# Patient Record
Sex: FEMALE | Race: WHITE | Employment: UNEMPLOYED | URBAN - METROPOLITAN AREA
[De-identification: names, ages, dates, MRNs, and addresses within clinical notes are randomized per-mention and may not be internally consistent; named-entity substitution may affect disease eponyms.]

---

## 2020-07-06 ENCOUNTER — VIRTUAL VISIT (OUTPATIENT)
Dept: FAMILY MEDICINE CLINIC | Age: 35
End: 2020-07-06

## 2020-07-06 DIAGNOSIS — M79.601 RIGHT ARM PAIN: ICD-10-CM

## 2020-07-06 DIAGNOSIS — M62.89 PELVIC FLOOR DYSFUNCTION: Primary | ICD-10-CM

## 2020-07-06 DIAGNOSIS — R00.2 HEART PALPITATIONS: ICD-10-CM

## 2020-07-06 DIAGNOSIS — M25.531 RIGHT WRIST PAIN: ICD-10-CM

## 2020-07-06 NOTE — PROGRESS NOTES
Natalee Raymond is a 28 y.o. female who was seen by synchronous (real-time) audio-video technology on 7/6/2020 for Wrist Pain (right)    Assessment & Plan:   Diagnoses and all orders for this visit:    1. Pelvic floor dysfunction  -     REFERRAL TO PHYSICAL THERAPY    2. Right arm pain  -     REFERRAL TO PHYSICAL THERAPY    3. Heart palpitations    4. Right wrist pain  -     REFERRAL TO PHYSICAL THERAPY      I spent at least 20 minutes on this visit with this new patient. 712  Subjective:     Patient states she recently moved from [place] [address]. Reports she was in pelvic floor therapy for pelvic floor dysfunction. States she has a lot pelvic pain. Would like to continue PT. Wrist: right wrist pain. Comments she has increased pain to posterior wrist radiating up to thumb and also radiates up her arm. Comments she also has right upper arm pain. States when she was playing softball in high school she felt something rip which she never had this evaluated. States if she uses arm too much she has increased pain. States after she had her last child in 2018 5 days after giving birth she coded and dx with magnesium toxicity. Comments at times she feels heart palpitations intermittently. Denies having further evaluation of heart palpitations. Prior to Admission medications    Not on File     There is no problem list on file for this patient. There are no active problems to display for this patient. Not on File  History reviewed. No pertinent past medical history. No past surgical history on file. No family history on file. Social History     Tobacco Use    Smoking status: Not on file   Substance Use Topics    Alcohol use: Not on file       Review of Systems   Constitutional: Negative for chills and fever. Respiratory: Negative for shortness of breath. Cardiovascular: Negative for chest pain and palpitations. Neurological: Negative for dizziness and headaches. Objective:   No flowsheet data found. General: alert, cooperative, no distress   Mental  status: normal mood, behavior, speech, dress, motor activity, and thought processes, able to follow commands   HENT: NCAT   Neck: no visualized mass   Resp: no respiratory distress   Neuro: no gross deficits   Skin: no discoloration or lesions of concern on visible areas   Psychiatric: normal affect, consistent with stated mood, no evidence of hallucinations     Additional exam findings: We discussed the expected course, resolution and complications of the diagnosis(es) in detail. Medication risks, benefits, costs, interactions, and alternatives were discussed as indicated. I advised her to contact the office if her condition worsens, changes or fails to improve as anticipated. She expressed understanding with the diagnosis(es) and plan. Candy Jones, who was evaluated through a patient-initiated, synchronous (real-time) audio-video encounter, and/or her healthcare decision maker, is aware that it is a billable service, with coverage as determined by her insurance carrier. She provided verbal consent to proceed: Yes, and patient identification was verified. It was conducted pursuant to the emergency declaration under the [address] [place] [address] authority and the [employer] General Act. A caregiver was present when appropriate. Ability to conduct physical exam was limited. I was in the office. The patient was at home.       Anuj Teague NP

## 2020-07-27 ENCOUNTER — HOSPITAL ENCOUNTER (OUTPATIENT)
Dept: PHYSICAL THERAPY | Age: 35
Discharge: HOME | End: 2020-07-27
Payer: COMMERCIAL

## 2020-07-27 PROCEDURE — 97165 OT EVAL LOW COMPLEX 30 MIN: CPT

## 2020-07-27 NOTE — PROGRESS NOTES
[facility]  [address] [place] [address]  [phone] fax    Plan of Care/Statement of Necessity for Occupational Therapy Services    Patient name: Candy Jones Start of Care: 2020   Referral source: Gregorio Ny NP : 1985    Medical Diagnosis: Pain in right arm [M79.601]  Pain in right wrist [M25.531]  Payor: [address] [place] [address] Road / Plan: 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 Assessment/ key information: *pt is a right hand dominant, 35 y.o.y/o, female who has had history of right shoulder and upper arm pain for over 10 years with on and off pattern when throwing etc.  She reports recent onset of wrist and thumb pain. She has tried a neoprene thumb brace at night, which relieved wrist pain but made thumb hurt worse when removed. Notable changes in activity which may have caused onset on recent move from [facility] [place] with increased lifting, carrying etc.  She has normal AROM in wrist with exception of radial deviaiton which is painful and reduced to 12 vs 20 for left. She is tender to palpation at radial wrist and one inch proximal.  She has (+) finklestein's test.  Wrist strength is reduced to 3+/5 for all except ulnar deviation.  is reduced at 32#, with pinches of 7-9#. 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   Her FOTO is 46/100 reflecting severe impairment in function. She will benefit from skilled occupational therapy to address right wrist and hand. pain. Focus of occupational therapy will be on more recent wrist and hand pain. Evaluation Complexity: History LOW Complexity : Brief history review  Examination LOW Complexity : 1-3 performance deficits relating to physical, cognitive , or psychosocial skils that result in activity limitations and / or participation restrictions  Clinical Decision Making LOW Complexity : No comorbidities that affect functional and no verbal or physical assistance needed to complete eval tasks   Overall Complexity Rating: LOW     Problem List: Pain effecting function, Decreased strength, Decreased ADL/functional abilities  and Decreased activity tolerance     Treatment Plan may include any combination of the following: Therapeutic exercise, Therapeutic activities, Physical agent/modality, Patient education and ADLs/IADLs    Patient / Family readiness to learn indicated by: asking questions, trying to perform skills and interest    Persons(s) to be included in education:   patient (P)    Barriers to Learning/Limitations: None    Patient Goal (s): less pain    Patient Self Reported Health Status: excellent    Rehabilitation Potential: good    Short Term Goals: To be accomplished in 2 weeks:  1.  patient will be familiar with positions and activities  to avoid to reduce symptoms of wrist and thumb pain. 2.  Patient will report pain in right wrist and thumb reduced to at least 4/10 with use of modaliteis in clinic. 3.  Patient will be able to move right wrist through all planes without pain. Long Term Goals: To be accomplished in 4 weeks:   1. Patient will increase right wrist strength to at least 4=/5 for lifting and carrying activities. 2.  Patient will report pain in right wrist decreased to 2/10 with routine childcare and home care tasks.   3.  Patient will increase right   by at least 20# for return to dominance for opening jars and cutting foods. 4.  Patient will achieve at least 10# pinch in all prehension patterns for fastening carseat, clothing etc.    5.  Patient will improve use of right hand for carrying and lifting as shown by increase in FOTO of at least 15 points. Frequency / Duration: Patient to be seen 2 times per week for 4 weeks:    Patient/ Caregiver education and instruction: Diagnosis, prognosis, self care, activity modification, brace/ splint application and exercises   [x]  Plan of care has been reviewed with EKVIN Queen 7/27/2020 11:01 AM    _____________________________________________________________________    I certify that the above Therapy Services are being furnished while the patient is under my care. I agree with the treatment plan and certify that this therapy is necessary.     Physician's Signature:____________Date:_________TIME:________    ** Signature, Date and Time must be completed for valid certification **    Please sign and return to [facility]   [address] [place] [address]  [phone] fax

## 2020-07-27 NOTE — PROGRESS NOTES
OT DAILY TREATMENT NOTE 10-18    Patient Name: Leonela Teresa  Date:2020  : 1985  [x]  Patient  Verified  Payor: [facility] [address] / Plan: [address] / Product Type: Managed Care Medicaid /    In time:1015  Out time:1045  Total Treatment Time (min): 30  Visit #: 1 of 8    Treatment Area: Pain in right arm [M79.601]  Pain in right wrist [M25.531]    SUBJECTIVE  Pain Level (0-10 scale): 9/10  Any medication changes, allergies to medications, adverse drug reactions, diagnosis change, or new procedure performed?: [x] No    [] Yes (see summary sheet for update)  Subjective functional status/changes:   [] No changes reported  Wrist really hurts    OBJECTIVE      30 min [x]Eval                  []Re-Eval           With   [] TE   [] TA   [] neuro   [] other: Patient Education: [x] Review HEP    [] Progressed/Changed HEP based on:  OT POC, [place]  [] positioning   [] body mechanics   [] transfers   [] heat/ice application   [] Splint wear/care   [] Sensory re-education   [] scar management      [] other:            Other Objective/Functional Measures:   Subjective: pt is a right hand dominant, 35 y.o.y/o, female who has had history of right shoulder and upper arm pain for over 10 years with on and off pattern when throwing etc.  Recent onset of wrist and thumb pain. 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        Objective:  Sensation:Reports occasional tingling  michi of Motion:Proximal AROm WFL     Active      Norms Right Left                                                                                      Wrist Flex 0-80 65 65      Ext 0-70 70 70      Ulnar Dev 0-30 28 28      Radial Dev 0-20 12p! 20       (+) finklestein's test    Strength:    Right Left                                                         Wrist Flex 3+ 5    Ext 3+ 5    Ulnar Dev 5 5    Radial Dev 3+ 5     Hand ROM R/L       Thumb    MP      CMC   PIP     80/85 MP   DIP     95/105 IP        45/50 Francis Abd        42/42 Radial Abd     Hand Strength:   Gross Grasp 3pt Pinch Lateral Pinch Tip Pinch   Right  32 7 8 9   Left 52 10 16 10     Nine-Hole Peg Test:  Left= _NT____seconds  Right=_15____seconds  Finger Opposition:to all tips and base    Palpation: Tender to 1 inch proximal to radial styloid    ADLs  Feeding:        []MaxA   []ModA   [x]Zehra   [] CGA   []SBA   []Lanie   []Independent  UE Dressing:       []MaxA   []ModA   [x]Zehra   [] CGA   []SBA   []Lanie   []Independent  LE Dressing:       []MaxA   []ModA   [x]Zehra   [] CGA   []SBA   []Lanie   []Independent  Grooming:       []MaxA   []ModA   [x]Zehra   [] CGA   []SBA   []Lanie   []Independent  Toileting:       []MaxA   []ModA   []Zehra   [] CGA   []SBA   []Lanie   [x]Independent  Bathing:       []MaxA   []ModA   [x]Zehra   [] CGA   []SBA   []Lanie   []Independent  Light Meal Prep:    []MaxA   [x]ModA   []Zehra   [] CGA   []SBA   []Lanie   []Independent  Household/Other: []MaxA   []ModA   [x]Zehra   [] CGA   []SBA   []Lanie   []Independent  Adaptive Equip:     []MaxA   []ModA   []Zehra   [] CGA   []SBA   []Lanie   []Independent  Driving:       []MaxA   []ModA   [x]Zehra   [] CGA   []SBA   []Lanie   []Independent  Money Mgmt:        []MaxA   []ModA   [x]Zehra   [] CGA   []SBA   []Lanie []Independent       Pain Level (0-10 scale) post treatment: 9/10    ASSESSMENT/Changes in Function: [x]  See Plan of Care  []  See progress note/recertification  []  See Discharge Summary             PLAN  []  Upgrade activities as tolerated     []  Continue plan of care  []  Update interventions per flow sheet       []  Discharge due to:_  []  Other:_      KORIN Whatley/L 7/27/2020  9:52 AM    Future Appointments   Date Time Provider Naren Khan   7/27/2020 10:15 AM Milla Worley OTR/[facility]   8/4/2020  2:20 PM Adelia Skiff, NP [facility]

## 2020-08-03 ENCOUNTER — HOSPITAL ENCOUNTER (OUTPATIENT)
Dept: PHYSICAL THERAPY | Age: 35
Discharge: HOME | End: 2020-08-03
Payer: COMMERCIAL

## 2020-08-03 PROCEDURE — 97161 PT EVAL LOW COMPLEX 20 MIN: CPT

## 2020-08-03 NOTE — PROGRESS NOTES
[facility]  [facility]  [address] [place] [address]  [phone] fax    Plan of Care/ Statement of Necessity for Physical Therapy Services    Patient name: Sukhwinder Cochran Start of Care: 8/3/2020   Referral source: Benton Boxer, NP : 1985    Medical Diagnosis: Pelvic floor dysfunction [M62.89]  Payor: [address] [place] [address] Road / Plan: [address] / Product Type: Managed Care Medicaid /  Onset Date: aggravated about 6 months    Treatment Diagnosis: PFD, abdomen and hip pain   Prior Hospitalization: see medical history Provider#: [id number]   Medications: Verified on Patient summary List    Comorbidities: n/a   Prior Level of Function: less pain, mod ind with ADLs/house chores and job duties           The Plan of Care and following information is based on the information from the initial evaluation. Assessment/ key information: Ms. Sukhwinder Cochran is a 29 y/o, F who present with c/o PFD, abdominal and hip pain. Pain aggravated with lifting, carrying her children, prolonged activities. Problem started since her last pregnancy and childbirth around . Pt recalls multiple complications during the pregnancy. Pt also went through a stressful situations during the last 1 year. Pt had 5 natural childbiths 2005, , 2012, 2016, and 2018. PT has been very helpful with her problem but she had gap due to personal and family problem. Pt reports no significant problem with bladder or bowel; only min urinary leakage. Pt reports pain which occurs with internal assessment and deep penetration during sexual relations. Pain is rated 1/3 on Marinoff scale. Pt also notices shooting pain along ant thighs. Evaluation reveals patient with musculoskeletal screen Left upslip, TTP along B iliopsoas and fair core strength. Pt demonstrated min increased tone of PF muscles but significant hypersensitivity.  She also demonstrates poor strength and endurance, 2/5 with significant overflow of TA and Valsalva Maneuver. Patient may benefit from physical therapy to address these limitations to improve her QOL. Evaluation Complexity History LOW Complexity : Zero comorbidities / personal factors that will impact the outcome / POC; Examination MEDIUM Complexity : 3 Standardized tests and measures addressing body structure, function, activity limitation and / or participation in recreation  ;Presentation LOW Complexity : Stable, uncomplicated  ;Clinical Decision Making MEDIUM Complexity : FOTO score of 26-74  Overall Complexity Rating: LOW     Problem List: Pelvic pain/dysfunction, Decreased pelvic floor mm awareness, Decreased pelvic floor mm strength, Use of accessory muscles, Improper voiding habits and Hypertonus of pelvic floor    Treatment Plan may include any combination of the following:   Therapeutic exercise, Urge suppression techniques, Neuromuscular re-education, Manual therapy, Physical agent/modality and Patient education  Patient / Family readiness to learn indicated by: asking questions, trying to perform skills and interest    Persons(s) to be included in education: patient (P)    Barriers to Learning/Limitations: None    Patient Goal (s): less pain    Patient Self Reported Health Status: good    Rehabilitation Potential: good    Short Term Goals: To be accomplished in 4  weeks:  1. Patient will demonstrate home exercise program accurately as adjunct to PT clinic visits to promote healthy lifestyle and increase quality of life. Status @ Eval: reviewed next visit     2. Patient will report ability to utilize [facility] with no pain to promote decrease of symptoms and increase quality of life. Status @ Eval: educate as indicated       3. Patient will report no more than 3-4/10 at worst to improve her QOL. Status @ Eval: 7/10 at worst      Long Term Goals: To be accomplished in 8  weeks:  1.  Patient will have FOTO score for PFDI Pain decreased by 5-8% points indicating improvement in function and report of no difficulty with maintaining intimate relations due to pain. Status @ Eval: 17     2. Patient will report no more than 0-1//10 at worst to improve her QOL. Status @ Eval: 7/10 at worst      3. Patient will demonstrate 3/5 strength of PF muscles to imp  Status @ Eval: 2/5 with significant compensation form TA and Valsalva Maneuver     4. Patient will be independent with HEP at time of discharge to be able to continue with pelvic floor program.  Status @ Eval: reviewed next visit     Frequency / Duration: Patient to be seen 2 times per week for 8 weeks. Patient/ Caregiver education and instruction: Diagnosis, prognosis, Proper Voiding Habits, Diet, Pain Management, Exercises and Bladder Retraining      Carina Masterson, PT 8/3/2020 3:26 PM    ________________________________________________________________________    I certify that the above Therapy Services are being furnished while the patient is under my care. I agree with the treatment plan and certify that this therapy is necessary.     Physician's Signature:____________Date:_________TIME:________    ** Signature, Date and Time must be completed for valid certification **      Please sign and return to [facility]   [address] [place] [address]  [phone] fax

## 2020-08-03 NOTE — PROGRESS NOTES
PF Daily Treatment Note  Patient Name: Lulú Foster  Date:8/3/2020  []  Patient  Verified  Insurance:Payor: [address] [place] [address] Road / Plan: [address] / Product Type: Managed Care Medicaid /   In time:1:32  Out time: 2:17  Total Treatment Time (min): 45  Total Timed Codes (min): 5  1:1 Treatment Time ( only): 39   Visit #: 1 of 12    Treatment Area: [x] Pelvic Floor     [] Other:  SUBJECTIVE  Pain Level (0-10 scale): 1-2/10  Any medication changes, allergies to medications, adverse drug reactions, diagnosis change, or new procedure performed?: [x] No    [] Yes (see summary sheet for update)    Ms. Lulú Foster is a 27 y/o, F who present with c/o PFD, abdominal and hip pain. Pain aggravated with lifting, carrying her children, prolonged activities. Problem started since her last pregnancy and childbirth around . Pt recalls multiple complications during the pregnancy. Pt also went through a stressful situations during the last 1 year. Pt had 5 natural childbiths , [id number], , and 2018. PT has been very helpful with her problem but she had gap due to personal and family problem. Pt reports no significant problem with bladder or bowel; only min urinary leakage. Pt reports pain which occurs with internal assessment and deep penetration during sexual relations. Pain is rated 1/3 on Marinoff scale. Pt also notices shooting pain along ant thighs. Pelvic Floor Dysfunction Evaluation  Min rotation of trunk with SLR  Mod TTP along B psoas muscles    Musculoskeletal Screen:    Skin Integrity:  [x] Healthy [] Red  [] Labia Atrophy [] Fragile    Sensation: [x] Intact [] Diminished:    Muscle Bulk: [x] Symmetrical  [] Well-developed [] Atrophied:  []L   []R   []B    Prolapse: none [] Cystocele:   [] Rectocele:    PERF Score (Performance/Endurance/Repetitions/Flicks)   P: 2 E: 2 R: F: 4 Total:    Patient has failed previous pelvic floor muscle training?   [] Yes    [] No    EMG Evaluation:  [] N/A [] Deferred secondary to:    Channel A: Electrode type:  [] Internal    [] Surface    [] Vaginal    [] Rectal  Channel B: Electrode location:    Baseline Resting Tone (1 minute)  Channel A (microvolts): Quality:  [] Normal [] Irradic [] Elevated  Channel B (microvolts): Slow Twitch: (10 second hold, 20 second rest)  Channel A (microvolts): Quality:[] Quick/slow rise [] Low net rise  Net rise (microvolts):   [] Slow Relaxation [] Incoordination       [] Unable to contract [] Fatigues at (sec):       [] Elevated baseline between contractions    Channel B (microvolts): Use of Accessory Muscles: [] Minimal  Net rise (microvolts):   [] Moderate  [] Excessive       [] No use of accessory muscles    Fast Twitch (3 second hold, 10 second rest)  Channel A (microvolts): Quality:[] Quick/slow rise [] Low net rise  Net rise (microvolts):   [] Slow Relaxation [] Incoordination       [] Unable to contract [] Fatigues at (sec):       [] Elevated baseline between contractions    Channel B (microvolts):  Use of Accessory Muscles: [] Minimal  Net rise (microvolts):   [] Moderate  [] Excessive       [] No use of accessory muscles    Optional Tests:  Lower abdominal strength: /5    Comments/Additional Tests:      OBJECTIVE     40 min Evaluation      5 min Therapeutic Activity:  []  See flow sheet :Pt edu within scope of practice on prognosis, POC, finding balance, relaxation techniques with Yoga   Rationale: increase ROM, increase strength, improve coordination, improve balance and increase proprioception  to improve the patients ability to improve tolerance for ADLs        min Patient Education: [x] Review HEP    [] Progressed/Changed HEP based on:   [] positioning   [] body mechanics   [] transfers   [] heat/ice application        Other Objective/Functional Measures:   []baseline resting tone:  []slow twitch mms   []fast twitch mms    Pain Level (0-10 scale) post treatment: 0/10    ASSESSMENT/Changes in Function: see POC please    []  Decrease # of leaks   [] No change []  Improving [] Resolved     []  Decrease hypertonus [] No change []  Improving [] Resolved     []  Increase void interval [] No change []  Improving [] Resolved     []  Increase PF strength [] No change []  Improving [] Resolved     []  Increase PF endurance [] No change []  Improving [] Resolved     []  Increase endurance [] No change []  Improving [] Resolved     []  Decrease # of pads [] No change []  Improving [] Resolved     []  Decrease pain [] No change []  Improving [] Resolved       Patient will continue to benefit from skilled PT services to modify and progress therapeutic interventions, address functional mobility deficits, address ROM deficits, address strength deficits, analyze and address soft tissue restrictions, analyze and cue movement patterns, analyze and modify body mechanics/ergonomics, assess and modify postural abnormalities and instruct in home and community integration to attain remaining goals.      [x]  See Plan of Care         PLAN  []  Upgrade activities as tolerated     []  Continue plan of care  []  Update interventions per flow sheet       []  Discharge due to:_  []  Other:_      Leslie Mendez, PT 8/3/2020  1:33 PM

## 2020-08-10 ENCOUNTER — APPOINTMENT (OUTPATIENT)
Dept: PHYSICAL THERAPY | Age: 35
End: 2020-08-10
Payer: COMMERCIAL

## 2020-08-11 ENCOUNTER — HOSPITAL ENCOUNTER (OUTPATIENT)
Dept: PHYSICAL THERAPY | Age: 35
Discharge: HOME | End: 2020-08-11
Payer: COMMERCIAL

## 2020-08-11 PROCEDURE — 97110 THERAPEUTIC EXERCISES: CPT

## 2020-08-11 PROCEDURE — 97032 APPL MODALITY 1+ESTIM EA 15: CPT

## 2020-08-11 PROCEDURE — 97535 SELF CARE MNGMENT TRAINING: CPT

## 2020-08-11 NOTE — PROGRESS NOTES
OT DAILY TREATMENT NOTE 10-18    Patient Name: Davin Sosa  Date:2020  : 1985  [x]  Patient  Verified  Payor: [address] [place] [address] Road / Plan: [address] / Product Type: Managed Care Medicaid /    In time:1200  Out time:1238  Total Treatment Time (min): 38  Visit #: 2 of 8    Treatment Area: Pain in right arm [M79.601]  Pain in right wrist [M25.531]  Thumb pain [M79.646]    SUBJECTIVE  Pain Level (0-10 scale): 5/10  Any medication changes, allergies to medications, adverse drug reactions, diagnosis change, or new procedure performed?: [x] No    [] Yes (see summary sheet for update)  Subjective functional status/changes:   [] No changes reported  Trying ot avoid the posiition  Trouble stirring to make cookies    OBJECTIVE    Modality rationale: decrease inflammation and decrease pain to improve the patients ability to grasp   Min Type Additional Details    [] Estim:  []Unatt       []IFC  []Premod                        []Other:  []w/ice   []w/heat  Position:  Location:    [] Estim: []Att    []TENS instruct  []NMES                    []Other:  []w/US   []w/ice   []w/heat  Position:  Location:    []  Traction: [] Cervical       []Lumbar                       [] Prone          []Supine                       []Intermittent   []Continuous Lbs:  [] before manual  [] after manual    []  Ultrasound: []Continuous   [] Pulsed                           []1MHz   []3MHz W/cm2:  Location:    []  Iontophoresis with dexamethasone         Location: [] Take home patch   [] In clinic    []  Ice     []  heat  []  Ice massage  []  Laser   []  Paraffin Position:  Location:   8 [x]  Laser with stim  []  Other:  Position:  Location:radial wrist    []  Vasopneumatic Device Pressure:       [] lo [] med [] hi   Temperature: [] lo [] med [] hi       [x] Skin assessment post-treatment:  [x]intact []redness- no adverse reaction    []redness  adverse reaction:       22 min Therapeutic Exercise:  [] See flow sheet :   Rationale: increase ROM to improve the patients ability to grasp  Wrist mazes x 10  each with modifiicaitons to avoid position of pain   Wrist mobiliizer 20x each way right  1/4 in pegs palm to digit and recip opp to 2,3 x 40      8 min Self Care/Home Management: Task modifications   Rationale: increase ROM  to improve the patients ability to reduce pain with kitchen childcare tasks  Reviewed kitchen modifications for lifting pots an pans, stirring, etc  Reviewed how to  child    With   [] TE   [] TA   [] neuro   [] other: Patient Education: [x] Review HEP    [] Progressed/Changed HEP based on: position to avoid  [] positioning   [] body mechanics   [] transfers   [] heat/ice application   [] Splint wear/care   [] Sensory re-education   [] scar management      [] other:            Other Objective/Functional Measures:   Pain with opp ot digits 4,5     Pain Level (0-10 scale) post treatment: 4/10    ASSESSMENT/Changes in Function: Improving pain    Patient will continue to benefit from skilled OT services to modify and progress therapeutic interventions, address ROM deficits, analyze and address soft tissue restrictions, analyze and cue movement patterns and instruct in home and community integration to attain remaining goals. []  See Plan of Care  []  See progress note/recertification  []  See Discharge Summary         Progress towards goals / Updated goals:  1.  patient will be familiar with positions and activities  to avoid to reduce symptoms of wrist and thumb pain. Status last visit: Met 8/11/2020  2. Patient will report pain in right wrist and thumb reduced to at least 4/10 with use of modaliteis in clinic. Status last visit; Met 8/11/2020 look for consistency  3. Patient will be able to move right wrist through all planes without pain.     Long Term Goals: To be accomplished in 4 weeks:          1.   Patient will increase right wrist strength to at least 4+/5 for lifting and carrying activities. 2.  Patient will report pain in right wrist decreased to 2/10 with routine childcare and home care tasks. 3.  Patient will increase right   by at least 20# for return to dominance for opening jars and cutting foods.   4.  Patient will achieve at least 10# pinch in all prehension patterns for fastening carseat, clothing etc.    5.  Patient will improve use of right hand for carrying and lifting as shown by increase in FOTO of at least 15 points.         PLAN  []  Upgrade activities as tolerated     [x]  Continue plan of care  []  Update interventions per flow sheet       []  Discharge due to:_  []  Other:_      Unique Mcarthur, OTR/L 8/11/2020  12:02 PM    Future Appointments   Date Time Provider Naren Khan   8/18/2020 11:15 AM Kristan Mace OTR/L [facility]   8/25/2020 11:15 AM Jimy Singh PT [facility]

## 2020-08-18 ENCOUNTER — HOSPITAL ENCOUNTER (OUTPATIENT)
Dept: PHYSICAL THERAPY | Age: 35
Discharge: HOME | End: 2020-08-18
Payer: COMMERCIAL

## 2020-08-18 PROCEDURE — 97110 THERAPEUTIC EXERCISES: CPT

## 2020-08-18 PROCEDURE — 97530 THERAPEUTIC ACTIVITIES: CPT

## 2020-08-18 PROCEDURE — 97032 APPL MODALITY 1+ESTIM EA 15: CPT

## 2020-08-18 NOTE — PROGRESS NOTES
OT DAILY TREATMENT NOTE 10-18    Patient Name: Lulú Foster  Date:2020  : 1985  [x]  Patient  Verified  Payor: [address] [place] [address] Road / Plan: [address] / Product Type: Managed Care Medicaid /    In time:1122  Out time:1155  Total Treatment Time (min): 33  Visit #: 3 of 8  Treatment Area: Pain in right arm [M79.601]  Pain in right wrist [M25.531]  Thumb pain [M79.646]    SUBJECTIVE  Pain Level (0-10 scale): 5/10  Any medication changes, allergies to medications, adverse drug reactions, diagnosis change, or new procedure performed?: [x] No    [] Yes (see summary sheet for update)  Subjective functional status/changes:   [] No changes reported  I am trying to practice the way you shoed me ot do things  Reports pain withpronation and wrist extension with therabar    OBJECTIVE    Modality rationale: decrease inflammation and decrease pain to improve the patients ability to move wrist freely   Min Type Additional Details    [] Estim:  []Unatt       []IFC  []Premod                        []Other:  []w/ice   []w/heat  Position:  Location:    [] Estim: []Att    []TENS instruct  []NMES                    []Other:  []w/US   []w/ice   []w/heat  Position:  Location:    []  Traction: [] Cervical       []Lumbar                       [] Prone          []Supine                       []Intermittent   []Continuous Lbs:  [] before manual  [] after manual    []  Ultrasound: []Continuous   [] Pulsed                           []1MHz   []3MHz W/cm2:  Location:    []  Iontophoresis with dexamethasone         Location: [] Take home patch   [] In clinic    []  Ice     []  heat  []  Ice massage  []  Laser   []  Paraffin Position:  Location:   10 [x]  Laser with stim  []  Other:  Position:  Location:    []  Vasopneumatic Device Pressure:       [] lo [] med [] hi   Temperature: [] lo [] med [] hi       [] Skin assessment post-treatment:  []intact []redness- no adverse reaction    []redness  adverse reaction:       13 min Therapeutic Exercise:  [] See flow sheet :   Rationale: increase ROM and increase strength to improve the patients ability to grasp  Yellow therabar x 10 with difficulty with wrist extension and pronaiton-unable to complete all reps  Wrist mazes 10x each right  Wrist mobilizer 20 x each way right    10 min Therapeutic Activity:  []  See flow sheet :   Rationale: increase ROM and improve coordination  to improve the patients ability to manipulate items without pain  Dexterity balls trial unable tin hand, completed 20x on table each way right  1/4 in pegs palm to digit and recip opp         With   [] TE   [] TA   [] neuro   [] other: Patient Education: [x] Review HEP    [] Progressed/Changed HEP based on:   [] positioning   [] body mechanics   [] transfers   [] heat/ice application   [] Splint wear/care   [] Sensory re-education   [] scar management      [] other:            Other Objective/Functional Measures:   Not able to do dexterity balls without pain  Pain with therabar extension, pronation  Able to oppose all digits now with 1/4 in pegs     Pain Level (0-10 scale) post treatment: 3/10    ASSESSMENT/Changes in Function: Improving pain free ROm, still with pain with resistance    Patient will continue to benefit from skilled OT services to modify and progress therapeutic interventions, address ROM deficits, address strength deficits, analyze and cue movement patterns and instruct in home and community integration to attain remaining goals. []  See Plan of Care  []  See progress note/recertification  []  See Discharge Summary         Progress towards goals / Updated goals:  1.  patient will be familiar with positions and activities  to avoid to reduce symptoms of wrist and thumb pain. Status last visit: Met 8/11/2020  2.  Patient will report pain in right wrist and thumb reduced to at least 4/10 with use of modaliteis in clinic. Status last visit;  Met 8/11/2020 look for consistency  3.  Patient will be able to move right wrist through all planes without pain. Status last visit; completed wrist mazes without increase in pain 8/18/2020     Long Term Goals: To be accomplished in 4 weeks:          1.  Patient will increase right wrist strength to at least 4+/5 for lifting and carrying activities. 2.  Patient will report pain in right wrist decreased to 2/10 with routine childcare and home care tasks. Status last visit; Progressing, now 5/10  3.  Patient will increase right   by at least 20# for return to dominance for opening jars and cutting foods.   4.  Patient will achieve at least 10# pinch in all prehension patterns for fastening carseat, clothing etc.    5.  Patient will improve use of right hand for carrying and lifting as shown by increase in FOTO of at least 15 points.  **    PLAN  []  Upgrade activities as tolerated     []  Continue plan of care  []  Update interventions per flow sheet       []  Discharge due to:_  []  Other:_      Lolly Romero OTR/L 8/18/2020  11:15 AM    Future Appointments   Date Time Provider Naren Khan   8/25/2020 11:15 AM Santy Zaragoza PT [facility]   9/15/2020  3:00 PM Santy Zaragoza PT [facility]   9/15/2020  3:45 PM Reyna Hilliard OTR/L [facility]   9/22/2020 11:15 AM Santy Zaragoza PT [facility]   9/22/2020 12:00 PM Reyna Hilliard OTR/L [facility]   9/29/2020 11:15 AM Santy Zaragoza PT [facility]   9/29/2020 12:00 PM Reyna Hilliard OTR/L [facility]

## 2020-08-25 ENCOUNTER — HOSPITAL ENCOUNTER (OUTPATIENT)
Dept: PHYSICAL THERAPY | Age: 35
Discharge: HOME | End: 2020-08-25
Payer: COMMERCIAL

## 2020-08-25 PROCEDURE — 97110 THERAPEUTIC EXERCISES: CPT

## 2020-08-25 PROCEDURE — 97530 THERAPEUTIC ACTIVITIES: CPT

## 2020-08-25 NOTE — PROGRESS NOTES
PF DAILY TREATMENT NOTE 3-16    Patient Name: Olivia Brice  Date:2020  : 1985  [x]  Patient  Verified  Payor: [address] [place] [address] Road / Plan: [address] / Product Type: Managed Care Medicaid /    In time:1124  Out time:1215  Total Treatment Time (min): 35  Visit #: 2 of 8    Medicare/BCBS Only   Total Timed Codes (min):   1:1 Treatment Time:       Treatment Area: [x] Pelvic Floor     [] Other:    SUBJECTIVE  Pain Level (0-10 scale): 3/10  Any medication changes, allergies to medications, adverse drug reactions, diagnosis change, or new procedure performed?: [x] No    [] Yes (see summary sheet for update)  Subjective functional status/changes:   [] No changes reported    Pt reports she always has a little bit of lower abdominal/pelvic pain. Pt has had pelvic floor therapy in the past. She has been dx with cysts. She does not use tampons because of discomfort. Pt reports discomfort with vaginal exams.          OBJECTIVE    Modality rationale:    Min Type Additional Details    [] Estim:  []Unatt       []IFC  []Premod                        []Other:  []w/ice   []w/heat  Position:  Location:    [] Estim: []Att    []TENS instruct  []NMES                    []Other:  []w/US   []w/ice   []w/heat  Position:  Location:    []  Ultrasound: []Continuous   [] Pulsed                           []1MHz   []3MHz Position:  Location:    []  Ice     []  heat  []  Ice massage  []  Laser   []  Anodyne Position:  Location:   [] Skin assessment post-treatment:  []intact []redness- no adverse reaction    []redness  adverse reaction:         25 min Therapeutic Exercise:  [] See flow sheet :   [x]  Pelvic floor strengthening                 []  Pelvic floor downtraining  [x]  Quality pelvic floor contractions       []  Relaxation techniques  []  Urge suppression exercises  []  Other:  Rationale: increase strength and improve coordination  to improve the patients ability to improve PF relaxation and isolation to improve function. 10 min Therapeutic Activity:  []  See flow sheet :    []  Increase Tissue extensibility        []  Assess fiber intake    [x]  Assess voiding habits  []  Assess bowel habits  []  Other:   Rationale: improve coordination and improve PF awareness  to improve the patients ability to reduce pain, improve relaxation and improve QOL          With   [x] TE   [x] TA   [] neuro  [] manual   [] other: Patient Education: [x] Review HEP    [] Progressed/Changed HEP based on:   [] positioning   [] body mechanics   [] transfers   [] heat/ice application    [x] other: issue, review and update to HEP     Other Objective/Functional Measures: Added PF exercises per flow sheet  Pt challenged with relaxation exercises and engaging PF  Pt able to isolate PF slightly better in prone    Pain Level (0-10 scale) post treatment: 3/10    ASSESSMENT/Changes in Function: Mrs. Kimi Marks is challenged with PF isolation and relaxation. She is most successful in the S/L position and does well with diaphragmatic breathing. She will benefit from continued PT and biofeedback to reduce pain and improve PF function.     []  Decrease # of leaks   [] No change []  Improving [] Resolved     []  Decrease hypertonus [] No change []  Improving [] Resolved     []  Increase void interval [] No change []  Improving [] Resolved     []  Increase PF strength [] No change []  Improving [] Resolved     []  Increase PF endurance [] No change []  Improving [] Resolved     []  Increase endurance [] No change []  Improving [] Resolved     []  Decrease # of pads [] No change []  Improving [] Resolved     []  Decrease pain [] No change []  Improving [] Resolved     []  Increased coordination [] No change []  Improving [] Resolved     []  Increased Bowel Frequency [] No change []  Improving [] Resolved       Patient will continue to benefit from skilled PT services to modify and progress therapeutic interventions, address functional mobility deficits, address strength deficits, analyze and address soft tissue restrictions, analyze and cue movement patterns, analyze and modify body mechanics/ergonomics and instruct in home and community integration to attain remaining goals. []  See Plan of Care  []  See progress note/recertification  []  See Discharge Summary         Progress towards goals / Updated goals:  1. Patient will demonstrate home exercise program accurately as adjunct to PT clinic visits to promote healthy lifestyle and increase quality of life. Status @ Eval: reviewed next visit  Current status: updated, issued and reviewed 8/25/2020     2. Patient will report ability to utilize [employer] with no pain to promote decrease of symptoms and increase quality of life. Status @ Eval: educate as indicated       3. Patient will report no more than 3-4/10 at worst to improve her QOL. Status @ Eval: 7/10 at [address] be accomplished in 8  weeks:  1. Patient will have FOTO score for PFDI Pain decreased by 5-8% points indicating improvement in function and report of no difficulty with maintaining intimate relations due to pain. Status @ Eval: 17     2. Patient will report no more than 0-1//10 at worst to improve her QOL. Status @ Eval: 7/10 at worst      3. Patient will demonstrate 3/5 strength of PF muscles to imp  Status @ Eval: 2/5 with significant compensation form TA and Valsalva Maneuver     4.  Patient will be independent with HEP at time of discharge to be able to continue with pelvic floor program.  Status @ Eval: reviewed next visit     PLAN  []  Upgrade activities as tolerated     [x]  Continue plan of care  []  Update interventions per flow sheet       []  Discharge due to:_  [x]  Other: Biofeedback next 1-3 visits; FLYNN Sanchez, PT 8/25/2020  11:24 AM    Future Appointments   Date Time Provider Naren Khan   9/1/2020  9:45 AM Eleonora Stauffer [id number]   9/3/2020  9:45 AM Kristin Coe [facility]UZNOTE [facility]   9/8/2020  6:00 PM Kale Cord, OTR/L [facility]   9/9/2020  9:00 AM Hilda Sheikh, NP [facility]   9/15/2020  3:00 PM Stevie Rush, PT [id number] [facility]   9/15/2020  3:45 PM Kale Cord, OTR/L [facility]   9/22/2020 11:15 AM Stevie Rush, PT [facility]   9/22/2020 12:00 PM Columbia Cord, OTR/L [facility]   9/29/2020 11:15 AM Stevie Rush, PT PBIHBOX [facility]   9/29/2020 12:00 PM Kale Cord, OTR/L [facility]

## 2020-09-01 ENCOUNTER — HOSPITAL ENCOUNTER (OUTPATIENT)
Dept: PHYSICAL THERAPY | Age: 35
Discharge: HOME | End: 2020-09-01
Payer: COMMERCIAL

## 2020-09-01 PROCEDURE — 97032 APPL MODALITY 1+ESTIM EA 15: CPT

## 2020-09-01 PROCEDURE — 97110 THERAPEUTIC EXERCISES: CPT

## 2020-09-01 NOTE — PROGRESS NOTES
OT DAILY TREATMENT NOTE - [facility]     Patient Name: Manjinder Randolph  Date:2020  : 1985  [x]  Patient  Verified  [facility] [address] / Plan: [address] / Product Type: Managed Care Medicaid /    In time: 9:47 Out time: 10:35  Total Treatment Time (min): 48  Visit #: 4 of 8    Treatment Area: Pain in right arm [M79.601]  Pain in right wrist [M25.531]  Thumb pain [M79.646]    SUBJECTIVE  Pain Level (0-10 scale): 4-5/10  Any medication changes, allergies to medications, adverse drug reactions, diagnosis change, or new procedure performed?: [x] No    [] Yes (see summary sheet for update)  Subjective functional status/changes:   [] No changes reported    Some pain today because she was moving stuff around in the garage. Hasn't been here due to unavailble appointments. OBJECTIVE    Modality rationale: decrease pain to improve the patients wrist ROM and improve /grasp.     Min Type Additional Details    [] Estim:  []Unatt       []IFC  []Premod                        []Other:  []w/ice   []w/heat  Position:  Location:    [] Estim: []Att    []TENS instruct  []NMES                    []Other:  []w/US   []w/ice   []w/heat  Position:  Location:    []  Traction: [] Cervical       []Lumbar                       [] Prone          []Supine                       []Intermittent   []Continuous Lbs:  [] before manual  [] after manual    []  Ultrasound: []Continuous   [] Pulsed                           []1MHz   []3MHz W/cm2:  Location:    []  Iontophoresis with dexamethasone         Location: [] Take home patch   [] In clinic    []  Ice     []  heat  []  Ice massage  []  Laser   []  Anodyne Position:  Location:   8 minutes [x]  Laser with stim  []  Other:  Position: Seated  Location: Right wrist.     []  Vasopneumatic Device Pressure:       [] lo [] med [] hi   Temperature: [] lo [] med [] hi   [x] Skin assessment post-treatment:  [x]intact []redness- no adverse reaction    []redness  adverse reaction:     40 min Therapeutic Exercise:  [] See flow sheet :   Rationale: increase ROM and increase strength to improve the patients wrist ROM and /grasp. MMT on wrist for re-check. Testing wrist ROM for re-check. Testing  strength and pinch strength for re-check. With   [] TE   [] TA   [] neuro   [] other: Patient Education: [] Review HEP    [] Progressed/Changed HEP based on:   [] positioning   [] body mechanics   [] transfers   [] heat/ice application   [] Splint wear/care   [] Sensory re-education   [] scar management      [] other:            Other Objective/Functional Measures:     Objective:                   Active  Eval 7/27/2020  Active   9/1/2020       Norms Right Left    Right   Wrist Flex 0-80 65 65    63     Ext 0-70 70 70    63     Ulnar Dev 0-30 28 28    26     Radial Dev 0-20 12p! 20    17            Strength: MMT R wrist    Eval 7/27/2020 9/1/2020    Right Left Right Left            Flexion  3+ 5 4 5   Extension 3+ 5 4 5   Ulnar Dev 5 5 5 5   Radial Dev 3+ 5 4 5                       Hand ROM    Thumb  7/27/2020 at Eval  Thumb   9/1/2020    R/L  R             MP  80/85   0-80    IP 95/105   0-93    Francis ABD 45/50   50     Radial ABD   42/42   52    MEDINA       Change                 Measurements: Taken with [place] Dynamometer, in Lbs    727/2020 Eval  R/L   9/1/2020  R      32/52 45   3 point  7/10 12   Lateral 8/16 18   Tip 9/10 13                    Pain Level (0-10 scale) post treatment:  4/10    ASSESSMENT/Changes in Function:     Patient benefits from education on self-management and positioning. Patient progressing with hand and wrist strength. Patient would continue to benefit from wrist ROM. Patient's pain continues with childcare and homecare tasks.      Patient will continue to benefit from skilled OT services to address ROM deficits, address strength deficits, analyze and address soft tissue restrictions and analyze and modify body mechanics/ergonomics to attain remaining goals. [x]  See Plan of Care  []  See progress note/recertification  []  See Discharge Summary         Progress towards goals / Updated goals:    1.  patient will be familiar with positions and activities  to avoid to reduce symptoms of wrist and thumb pain. Status last visit: Met 8/11/2020 9/1/2020- Reviewed in clinic for wrist positioning with cooking. 2.  Patient will report pain in right wrist and thumb reduced to at least 4/10 with use of modaliteis in clinic. Status last visit; Met 8/11/2020 look for consistency  9/1/2020- Goal Met     3.  Patient will be able to move right wrist through all planes without pain. Status last visit; completed wrist mazes without increase in pain 8/18/2020.  9/1/2020- Goal Met     Long Term Goals: To be accomplished in 4 weeks:          1.  Patient will increase right wrist strength to at least 4+/5 for lifting and carrying activities. 9/1/2020- Wrist strength at 4.    2.  Patient will report pain in right wrist decreased to 2/10 with routine childcare and home care tasks. Status last visit; Progressing, now 8/11.  9/1/2020- 5/10 with childcare and home care tasks. 3.  Patient will increase right   by at least 20# for return to dominance for opening jars and cutting foods. 9/1/2020-  strength +13.    4.  Patient will achieve at least 10# pinch in all prehension patterns for fastening carseat, clothing etc.    9/1/2020- 3 point +5, lateral +10, tip +4.     5.  Patient will improve use of right hand for carrying and lifting as shown by increase in FOTO of at least 15 points. 9/1/2020- NT due to time constraints. PLAN  []  Upgrade activities as tolerated     [x]  Continue plan of care  []  Update interventions per flow sheet       []  Discharge due to:_  []  Other:_      Kristin Amador.  JAMES Hough 9/1/2020  8:10 AM    Future Appointments   Date Time Provider Naren Khan   9/1/2020 9:45 AM Yaima Charles [id number] [facility]   9/3/2020  9:45 AM Yaima Charles [id number] [facility]   9/8/2020  6:00 PM Valentino Hunter, OTR/L [facility]   9/9/2020  9:00 AM Nereida Tubbs, NP [facility]   9/15/2020  3:00 PM Ollie Mcleod, PT MZKOCET [facility]   9/15/2020  3:45 PM Valentino Hunter, OTR/L [facility]   9/22/2020 11:15 AM Ollie Mcleod, PT [id number] [facility]   9/22/2020 12:00 PM Valentino Hunter, OTR/L [facility]   9/29/2020 11:15 AM Ollie Mcleod, PT [id number] [facility]   9/29/2020 12:00 PM Valentino Hunter, OTR/L [facility]

## 2020-09-03 ENCOUNTER — HOSPITAL ENCOUNTER (OUTPATIENT)
Dept: PHYSICAL THERAPY | Age: 35
Discharge: HOME | End: 2020-09-03
Payer: COMMERCIAL

## 2020-09-03 NOTE — PROGRESS NOTES
[facility]  [facility]  [address] [facility]  [phone] fax    Occupational Therapy Progress Note  Patient name: Karson Garcia Start of Care: 20   Referral source: Kiet Lion NP : 1985   Medical/Treatment Diagnosis: Pain in right arm [M79.601]  Pain in right wrist [M25.531]  Thumb pain [M79.646]  Payor: [facility] [address] / Plan: 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 driving, activities, volleyball sports                      Visits from Start of Care: 4    Missed Visits: 0    Established Goals:         Excellent           Good         Limited           None  [] Increased ROM   []  []  []  []  [x] Increased Strength  []  []  [x]  []  [] Increased Mobility  []  []  []  []   [x] Decreased Pain   []  []  [x]  []  [] Decreased Swelling  []  []  []  []  [] Increased Fine Motor Skills []  []  []  []  [x] Increased ADL [place] []  []  []  []    Key Functional Changes:  Strength improving                         Active  Eval 2020  Active   2020       Norms Right Left    Right   Wrist Flex 0-80 65 65    63     Ext 0-70 70 70    63     Ulnar Dev 0-30 28 28    26     Radial Dev 0-20 12p! 20    17      Strength: MMT R wrist           Eval 2020     Right Left Right Left               Flexion  3+ 5 4 5   Extension 3+ 5 4 5   Ulnar Dev 5 5 5 5   Radial Dev 3+ 5 4 5                              Hand ROM           Thumb  2020 at Eval   Thumb   2020     R/L   R                 MP  80/85    0-80     IP 95/105    0-93     Francis ABD 45/50    50      Radial ABD    42/42    52     MEDINA           Change              Measurements: Taken with [employer] Dynamometer, in Lbs    727/2020 Eval  R/L    9/1/2020  R       32/52 45   3 point  7/10 12   Lateral 8/16 18   Tip 9/10 13                     Progress towards goals:  1.  patient will be familiar with positions and activities  to avoid to reduce symptoms of wrist and thumb pain. Status last visit: Met 8/11/2020 9/1/2020- Reviewed in clinic for wrist positioning with cooking.      2.  Patient will report pain in right wrist and thumb reduced to at least 4/10 with use of modaliteis in clinic. Status last visit; Met 8/11/2020 look for consistency  9/1/2020- Goal Met     3.  Patient will be able to move right wrist through all planes without pain. Status last visit; completed wrist mazes without increase in pain 8/18/2020.  9/1/2020- Goal Met     Long Term Goals: To be accomplished in 4 weeks:          1.  Patient will increase right wrist strength to at least 4+/5 for lifting and carrying activities. 9/1/2020- Wrist strength at 4.     2.  Patient will report pain in right wrist decreased to 2/10 with routine childcare and home care tasks. Status last visit; Progressing, now 8/11.  9/1/2020- 5/10 with childcare and home care tasks.      3.  Patient will increase right   by at least 20# for return to dominance for opening jars and cutting foods. 9/1/2020-  strength +13.     4.  Patient will achieve at least 10# pinch in all prehension patterns for fastening carseat, clothing etc.    9/1/2020- 3 point +5, lateral +10, tip +4.      5.  Patient will improve use of right hand for carrying and lifting as shown by increase in FOTO of at least 15 points. 9/1/2020- NT due to time constraints    Updated Goals: to be achieved in 5 visits:          1.  Patient will increase right wrist strength to at least 4+/5 for lifting and carrying activities.   Status at Last PN:  4     2.  Patient will report pain in right wrist decreased to 2/10 with routine childcare and home care tasks.  Status last PN:  5/10    3.  Patient will increase right   by at least 20# for return to dominance for opening jars and cutting foods. Status last PN- 45 ( +13)      5.  Patient will improve use of right hand for carrying and lifting as shown by increase in FOTO of at least 15 points. Status Last PN: 46 (NT for recheck)      ASSESSMENT/RECOMMENDATIONS:  [x]Continue therapy per initial plan/protocol at a frequency of  2 x per week for 5 visits  []Continue therapy with the following recommended changes:_____________________      _____________________________________________________________________  []Discontinue therapy progressing towards or have reached established goals  []Discontinue therapy due to lack of appreciable progress towards goals  []Discontinue therapy due to lack of attendance or compliance  []Await Physician's recommendations/decisions regarding therapy  []Other:________________________________________________________________    Thank you for this referral.   IRON Rainey  9/3/2020 8:26 AM  NOTE TO PHYSICIAN:  [address] TO [facility] Physical Therapy: [phone]  If you are unable to process this request in 24 hours please contact our office: [phone] I have read the above report and request that my patient continue as recommended. ? I have read the above report and request that my patient continue therapy with the following changes/special instructions:________________________________________________________  ? I have read the above report and request that my patient be discharged from therapy.     [de-identified] Signature:____________Date:_________TIME:________    [facility], Date and Time must be completed for valid certification **

## 2020-09-03 NOTE — PROGRESS NOTES
OT DAILY TREATMENT NOTE - [facility]  Patient Name: Vianey Mcfarland SOVC: : 1985 [x]  Patient  Verified Payor: [address] [place] [address] Road / Plan: [address] / Product Type: Managed Care Medicaid / In time:***  Out time:*** Total Treatment Time (min): *** Total Timed Codes (min): *** 
1:1 Treatment Time ( [address] only): *** Visit #: *** of *** Treatment Area: Pain in right arm [M79.601] Pain in right wrist [M25.531] Thumb pain [M79.646] SUBJECTIVE Pain Level (0-10 scale): *** Any medication changes, allergies to medications, adverse drug reactions, diagnosis change, or new procedure performed?: [x] No    [] Yes (see summary sheet for update) Subjective functional status/changes:   [] No changes reported *** OBJECTIVE Modality rationale: {BSI INMOTION MODALITIES:69388} to improve the patients ability to *** Min Type Additional Details  
 [] Estim:  []Unatt       []IFC  []Premod []Other:  []w/ice   []w/heat Position: Location:  
 [] Estim: []Att    []TENS instruct  []NMES []Other:  []w/US   []w/ice   []w/heat Position: Location:  
 []  Traction: [] Cervical       []Lumbar 
                     [] Prone          []Supine []Intermittent   []Continuous Lbs: 
[] before manual 
[] after manual  
 []  Ultrasound: []Continuous   [] Pulsed []1MHz   []3MHz W/cm2: 
Location:  
 []  Iontophoresis with dexamethasone Location: [] Take home patch  
[] In clinic  
 []  Ice     []  heat 
[]  Ice massage 
[]  Laser  
[]  Anodyne Position: Location:  
 []  Laser with stim 
[]  Other:  Position: Location:  
 []  Vasopneumatic Device Pressure:       [] lo [] med [] hi  
Temperature: [] lo [] med [] hi  
[] Skin assessment post-treatment:  []intact []redness- no adverse reaction 
  []redness  adverse reaction:  
 
*** min Therapeutic Exercise:  [] See flow sheet :  
 Rationale: {BSHSI IMMOTION THER EX:24212} to improve the patients ability to *** 
 
*** min Therapeutic Activity:  []  See flow sheet :  
Rationale: {BSHSI IMMOTION THER EX:18109}  to improve the patients ability to *** 
  
*** min Neuromuscular Re-education:  []  See flow sheet :  
Rationale: {BSHSI IMMOTION THER EX:03527}  to improve the patients ability to *** 
 
*** min Manual Therapy:  *** Rationale: {BSHSI IMMOTION MANUAL THERAPY:02737} to *** 
 
 min Orthotic/Splinting: *** Rationale: {BSHSI IMMOTION THER EX:65292}  to improve the patients ability to *** 
 
*** min Self Care/Home Management: *** Rationale: {BSHSI IMMOTION THER EX:38283}  to improve the patients ability to *** With 
 [] TE 
 [] TA 
 [] neuro 
 [] other: Patient Education: [x] Review HEP [] Progressed/Changed HEP based on:  
[] positioning   [] body mechanics   [] transfers   [] heat/ice application  
[] Splint wear/care   [] Sensory re-education   [] scar management     
[] other:   
 
     
Other Objective/Functional Measures: *** Pain Level (0-10 scale) post treatment: *** ASSESSMENT/Changes in Function: *** Patient will continue to benefit from skilled OT services to {[facility] INMOTION ASSESSMENT STATEMENTS:20159} to attain remaining goals. [x]  See Plan of Care 
[]  See progress note/recertification 
[]  See Discharge Summary Progress towards goals / Updated goals: 
*** Long Term Goals: To be accomplished in [address] will increase right wrist strength to at least 4+/5 for lifting and carrying activities. 9/1/2020- Wrist strength at 4. 
  
2.  Patient will report pain in right wrist decreased to 2/10 with routine childcare and home care tasks. Status last visit; Progressing, now 8/11. 
9/1/2020- 5/10 with childcare and home care tasks.  
  
3.  Patient will increase right   by at least 20# for return to dominance for opening jars and cutting foods. 9/1/2020-  strength +13. 
  
4.  Patient will achieve at least 10# pinch in all prehension patterns for fastening carseat, clothing etc.   
9/1/2020- 3 point +5, lateral +10, tip +4.  
  
5.  Patient will improve use of right hand for carrying and lifting as shown by increase in FOTO of at least 15 points. 9/1/2020- NT due to time constraints. PLAN 
[]  Upgrade activities as tolerated     [x]  Continue plan of care 
[]  Update interventions per flow sheet      
[]  Discharge due to:_ 
[]  Other:_   
 
JAMES Mason 9/3/2020  8:03 AM 
 
Future Appointments Date Time Provider Naren Khan 9/3/2020  9:45 AM Vy BLOUNT [facility]  
9/8/2020  6:00 PM Kae Farooq, OTR/L MMCPTPB [facility]  
9/9/2020  9:00 AM Lima Lacy NP [facility]  
9/15/2020  3:00 PM Reg Chi, PT [id number] [facility]  
9/15/2020  3:45 PM Kae Farooq, OTR/L MMCPTPB [facility]  
9/22/2020 11:15 AM Reg Chi, PT [id number] [facility]  
9/22/2020 12:00 PM Kae Farooq, OTR/L MMCPTPB [facility]  
9/29/2020 11:15 AM Reg Chi, PT VIMYSVS [facility]  
10/6/2020 12:00 PM Reg Chi, PT MMCPTPB [facility]  
10/13/2020 12:00 PM Reg Chi, PT MMCPTPB [facility]  
10/20/2020 12:00 PM Reg Chi, PT MMCPTPB [facility]  
10/27/2020 12:00 PM Reg Chi, PT MMCPTPB [facility]

## 2020-09-04 ENCOUNTER — HOSPITAL ENCOUNTER (OUTPATIENT)
Dept: PHYSICAL THERAPY | Age: 35
Discharge: HOME | End: 2020-09-04
Payer: COMMERCIAL

## 2020-09-04 PROCEDURE — 97110 THERAPEUTIC EXERCISES: CPT

## 2020-09-04 PROCEDURE — 97530 THERAPEUTIC ACTIVITIES: CPT

## 2020-09-04 PROCEDURE — 97032 APPL MODALITY 1+ESTIM EA 15: CPT

## 2020-09-04 NOTE — PROGRESS NOTES
OT DAILY TREATMENT NOTE - [facility]     Patient Name: Sheryle Shope  Date:2020  : 1985  [x]  Patient  Verified  Payor: [address] / Plan: [address] / Product Type: Managed Care Medicaid /    In time: 9:02  Out time: 9:46  Total Treatment Time (min): 44  Visit #: 1 of 5    Treatment Area: Pain in right arm [M79.601]  Pain in right wrist [M25.531]  Thumb pain [M79.646]    SUBJECTIVE  Pain Level (0-10 scale):   2-3/10   Any medication changes, allergies to medications, adverse drug reactions, diagnosis change, or new procedure performed?: [x] No    [] Yes (see summary sheet for update)  Subjective functional status/changes:   [] No changes reported    More pain yesterday than today. Mother in law is in town and has been helping. Lifting kids are still hurting wrist.   \" Wrist feels better doing wrist mazes this time versus the first day I did them\"  Reported pain with pinching with hand strengthening activity. OBJECTIVE    Modality rationale: decrease pain and increase tissue extensibility to improve the patients ROM.    Min Type Additional Details    [] Estim:  []Unatt       []IFC  []Premod                        []Other:  []w/ice   []w/heat  Position:  Location:    [] Estim: []Att    []TENS instruct  []NMES                    []Other:  []w/US   []w/ice   []w/heat  Position:  Location:    []  Traction: [] Cervical       []Lumbar                       [] Prone          []Supine                       []Intermittent   []Continuous Lbs:  [] before manual  [] after manual    []  Ultrasound: []Continuous   [] Pulsed                           []1MHz   []3MHz W/cm2:  Location:    []  Iontophoresis with dexamethasone         Location: [] Take home patch   [] In clinic    []  Ice     []  heat  []  Ice massage  []  Laser   []  Anodyne Position:  Location:   8 minutes [x]  Laser with stim  []  Other:  Position: Seated  Location:Right wrist    []  Vasopneumatic Device Pressure: [] lo [] med [] hi   Temperature: [] lo [] med [] hi   [x] Skin assessment post-treatment:  [x]intact []redness- no adverse reaction    []redness  adverse reaction:     26 min Therapeutic Exercise:  [] See flow sheet :   Rationale: increase ROM and increase strength to improve the patients ability to flex/extend wrist and improve /grasp. Wrist mazes x10. Hand strengthening activity with putty HEP. 10 min Therapeutic Activity:  []  See flow sheet :   Rationale: increase ROM, increase strength and improve coordination  to improve the patients ability to self-care and child-care. Fine Motor activity completed distally to increase UE strength by placing 1\" pegs into resistive foam board with 3# wrist weight to simulate child-care tasks such as fastening and lifting. With   [] TE   [] TA   [] neuro   [] other: Patient Education: [] Review HEP    [] Progressed/Changed HEP based on:   [] positioning   [] body mechanics   [] transfers   [] heat/ice application   [] Splint wear/care   [] Sensory re-education   [] scar management      [] other:            Other Objective/Functional Measures:     Able to complete all wrist ROM exercises without complaint of pain or discomfort. Patient unable to perform pinching exercises due to increased thumb pain. Pain Level (0-10 scale) post treatment:  3-4 /10    ASSESSMENT/Changes in Function:     Patient has a good understanding of self-management strategies. Patient benefits form education on joint protection techniques and proper positioning. Patient will continue to benefit from skilled OT services to address ROM deficits, address strength deficits and analyze and modify body mechanics/ergonomics to attain remaining goals.      [x]  See Plan of Care  []  See progress note/recertification  []  See Discharge Summary         Progress towards goals / Updated goals:    1.  Patient will increase right wrist strength to at least 4+/5 for lifting and carrying activities. Status at Last PN:  4  9/4/2020- Addressed in clinic with wrist strengthening exercises.      2.  Patient will report pain in right wrist decreased to 2/10 with routine childcare and home care tasks. Status last PN:  5/10     3.  Patient will increase right   by at least 20# for return to dominance for opening jars and cutting foods. Status last PN- 45 ( +13)   9/4/2020- Progressing in clinic with hand strengthening activities.       5.  Patient will improve use of right hand for carrying and lifting as shown by increase in FOTO of at least 15 points. Status Last PN: 46 (NT for recheck)  9/4/2020- Progressing in clinic with UE strengthening activities. PLAN  []  Upgrade activities as tolerated     [x]  Continue plan of care  []  Update interventions per flow sheet       []  Discharge due to:_  []  Other:_      Arsalan Johnston.  Amaya Casiano 9/4/2020  8:39 AM    Future Appointments   Date Time Provider Naren Khan   9/4/2020  9:00 AM Arlys Stacks HYSIGEY [facility]   9/10/2020 10:30 AM Arlys Stacks LMBCSSY [facility]   9/15/2020  3:00 PM Ever Gutierrez, PT [email or web address] [facility]   9/15/2020  3:45 PM Stephan العراقي, OTR/L MMCPTPB [facility]   9/16/2020 11:40 AM Nicole Donnelly NP [facility]   9/22/2020 11:15 AM Ever Gutierrez, PT [id number] [facility]   9/22/2020 12:00 PM Stephan العراقي, OTR/L MMCPTPB [facility]   9/29/2020 11:15 AM Cremaribel Gutierrez, PT [id number] [facility]   10/6/2020 12:00 PM Creta Matt, PT MMCPTPB [facility]   10/13/2020 12:00 PM Creta Matt, PT MMCPTPB [facility]   10/20/2020 12:00 PM Ever Gutierrez, FAWN MMCPTPB [facility]   10/27/2020 12:00 PM Ever Gutierrez PT [id number] [facility]

## 2020-09-08 ENCOUNTER — APPOINTMENT (OUTPATIENT)
Dept: PHYSICAL THERAPY | Age: 35
End: 2020-09-08
Payer: COMMERCIAL

## 2020-09-10 ENCOUNTER — HOSPITAL ENCOUNTER (OUTPATIENT)
Dept: PHYSICAL THERAPY | Age: 35
Discharge: HOME | End: 2020-09-10
Payer: COMMERCIAL

## 2020-09-10 PROCEDURE — 97110 THERAPEUTIC EXERCISES: CPT

## 2020-09-10 PROCEDURE — 97530 THERAPEUTIC ACTIVITIES: CPT

## 2020-09-10 PROCEDURE — 97032 APPL MODALITY 1+ESTIM EA 15: CPT

## 2020-09-10 NOTE — PROGRESS NOTES
OT DAILY TREATMENT NOTE - [facility]     Patient Name: Candy Jones  Date:9/10/2020  : 1985  [x]  Patient  Ver[facility]  [facility] [address] / Plan: [address] / Product Type: Managed Care Medicaid /    In time: 10:31  Out time: 11:12  Total Treatment Time (min): 40  Visit #: 2 of 8    Treatment Area: Pain in right arm [M79.601]  Pain in right wrist [M25.531]  Thumb pain [M79.646]    SUBJECTIVE  Pain Level (0-10 scale): 3-4/10  Any medication changes, allergies to medications, adverse drug reactions, diagnosis change, or new procedure performed?: [x] No    [] Yes (see summary sheet for update)  Subjective functional status/changes:   [] No changes reported    \"Wrist is feeling okay\"  Still having pain and discomfort if my wrist gets hit or when picking up children. OBJECTIVE    Modality rationale: decrease pain and increase tissue extensibility to improve the patients ROM.    Min Type Additional Details    [] Estim:  []Unatt       []IFC  []Premod                        []Other:  []w/ice   []w/heat  Position:  Location:    [] Estim: []Att    []TENS instruct  []NMES                    []Other:  []w/US   []w/ice   []w/heat  Position:  Location:    []  Traction: [] Cervical       []Lumbar                       [] Prone          []Supine                       []Intermittent   []Continuous Lbs:  [] before manual  [] after manual    []  Ultrasound: []Continuous   [] Pulsed                           []1MHz   []3MHz W/cm2:  Location:    []  Iontophoresis with dexamethasone         Location: [] Take home patch   [] In clinic    []  Ice     []  heat  []  Ice massage  []  Laser   []  Anodyne Position:  Location:   10 minutes [x]  Laser with stim  []  Other:  Position: seat  Location: right medial epicondyle     []  Vasopneumatic Device Pressure:       [] lo [] med [] hi   Temperature: [] lo [] med [] hi   [x] Skin assessment post-treatment:  [x]intact []redness- no adverse reaction []redness  adverse reaction:     15 min Therapeutic Exercise:  [] See flow sheet :   Rationale: increase ROM and increase strength to improve the patients ability     Wrist ROM ball rolls x15. Supination wheel x10. Wrist flexion/extension 4# dumbell x15 each way. 15 min Therapeutic Activity:  []  See flow sheet :   Rationale: increase ROM, increase strength and Patient education   to improve the patients ability to self-manage. Review on proper positioning for picking up kids and proper sleeping positions. Review self-management strategies for pain. Lg Med Putty - Radial/ulnar deviation into putty with large knob to simulate opening jars    With   [] TE   [] TA   [] neuro   [] other: Patient Education: [x] Review HEP    [] Progressed/Changed HEP based on:   [] positioning   [] body mechanics   [] transfers   [] heat/ice application   [] Splint wear/care   [] Sensory re-education   [] scar management      [] other:            Other Objective/Functional Measures:     Patient able to complete all wrist strengthening activities without complaint of pain. Pain Level (0-10 scale) post treatment:   3-4 /10     ASSESSMENT/Changes in Function:     Patient benefits from education positioning and joint protection techniques to help ease discomfort with self and . Patient progressing with wrist and hand strengthening activities without complaint of pain or discomfort. Patient will continue to benefit from skilled OT services to address ROM deficits, address strength deficits, analyze and address soft tissue restrictions and analyze and modify body mechanics/ergonomics to attain remaining goals. [x]  See Plan of Care  []  See progress note/recertification  []  See Discharge Summary         Progress towards goals / Updated goals:    1.  Patient will increase right wrist strength to at least 4+/5 for lifting and carrying activities.   Status at Last PN:  4   9/10/2020- Progressing in clinic with wrist strengthening activity.      2.  Patient will report pain in right wrist decreased to 2/10 with routine childcare and home care tasks. Status last PN:  5/10     3.  Patient will increase right   by at least 20# for return to dominance for opening jars and cutting foods. Status last PN- 45 ( +13)  9/10/2020- Progressing in clinic with hand strengthening activity.       5.  Patient will improve use of right hand for carrying and lifting as shown by increase in FOTO of at least 15 points. Status Last PN: 46 (NT for recheck)  9/10/2020- Education provided on joint protection and proper carrying techniques. PLAN  []  Upgrade activities as tolerated     [x]  Continue plan of care  []  Update interventions per flow sheet       []  Discharge due to:_  []  Other:_      Ronnie Mercado.  Saw Cohen 9/10/2020  8:08 AM    Future Appointments   Date Time Provider Naren Khan   9/10/2020 10:30 AM Dora ROTH [facility]   9/15/2020  3:00 PM [employer] [place] [employer], PT [id number] [facility]   9/15/2020  3:45 PM Taras Hernandes, OTR/L MMCPTPB [facility]   9/16/2020 11:40 AM Reyna Edgar, NP [facility]   9/22/2020 11:15 AM [employer], PT [id number] [facility]   9/22/2020 12:00 PM Taras Hernandes, OTR/L MMCPTPB [facility]   9/29/2020 11:15 AM [employer], PT [id number] [facility]   10/6/2020 12:00 PM [employer], PT MMCPTPB [facility]   10/13/2020 12:00 PM [employer], PT MMCPTPB [facility]   10/20/2020 12:00 PM [employer], PT MMCPTPB [facility]   10/27/2020 12:00 PM [employer] [place] [employer], PT MMCPTPB [facility]

## 2020-09-15 ENCOUNTER — HOSPITAL ENCOUNTER (OUTPATIENT)
Dept: PHYSICAL THERAPY | Age: 35
Discharge: HOME | End: 2020-09-15
Payer: COMMERCIAL

## 2020-09-15 PROCEDURE — 97110 THERAPEUTIC EXERCISES: CPT

## 2020-09-15 PROCEDURE — 97530 THERAPEUTIC ACTIVITIES: CPT

## 2020-09-15 PROCEDURE — 90912 BFB TRAINING 1ST 15 MIN: CPT

## 2020-09-15 PROCEDURE — 97032 APPL MODALITY 1+ESTIM EA 15: CPT

## 2020-09-15 NOTE — PROGRESS NOTES
PF DAILY TREATMENT NOTE 3-16    Patient Name: Brown Garces  Date:9/15/2020  : 1985  [x]  Patient  Verified  [facility] [address] / Plan: [address] / Product Type: Managed Care Medicaid /    In time:313  Out time:350  Total Treatment Time (min): 37  Visit #: 3 of 8    Medicare/BCBS Only   Total Timed Codes (min):  3 1:1 Treatment Time:  14     Treatment Area: [x] Pelvic Floor     [] Other:    SUBJECTIVE  Pain Level (0-10 scale): 3-4/10  Any medication changes, allergies to medications, adverse drug reactions, diagnosis change, or new procedure performed?: [x] No    [] Yes (see summary sheet for update)  Subjective functional status/changes:   [] No changes reported  Pt reports soreness in pelvic area. Pt reports she tried to do exercises intermittently. Gap in treatment due to difficulty getting schedule coordinated with available appointments.      OBJECTIVE    Modality rationale:    Min Type Additional Details    [] Estim:  []Unatt       []IFC  []Premod                        []Other:  []w/ice   []w/heat  Position:  Location:   15 [] Estim: []Att    []TENS instruct  []NMES                    []Other: Biofeedback []w/US   []w/ice   []w/heat  Position: supine  Location: internal pelvic floor sensor    []  Ultrasound: []Continuous   [] Pulsed                           []1MHz   []3MHz Position:  Location:    []  Ice     []  heat  []  Ice massage  []  Laser   []  Anodyne Position:  Location:   [] Skin assessment post-treatment:  []intact []redness- no adverse reaction    []redness  adverse reaction:         12 min Therapeutic Exercise:  [] See flow sheet :   [x]  Pelvic floor strengthening                 [x]  Pelvic floor downtraining  [x]  Quality pelvic floor contractions       [x]  Relaxation techniques  []  Urge suppression exercises  []  Other:  Rationale: increase strength and improve coordination  to improve the patients ability to reduce UI episodes, reduce pain with ADL's and improve PF function. 10 min: Therapeutic Activity  Assess voiding  Increase tissue extensibility    Rationale: increased coordination and improve PF awareness to reduce pain with intercourse and improve QOL               With   [] TE   [] TA   [] neuro  [] manual   [] other: Patient Education: [x] Review HEP    [] Progressed/Changed HEP based on:   [] positioning   [] body mechanics   [] transfers   [] heat/ice application    [] other: HEP reviewed, updated and issued. Other Objective/Functional Measures:   []baseline resting tone:  A 3.9, B 0.3  []slow twitch mms  A 5.0, B 1.2  []fast twitch mms A 3.2, B 0.3    Pain Level (0-10 scale) post treatment: 0/10    ASSESSMENT/Changes in Function: Mrs. Kristine Mathew demonstrates increased resting tone, but good PF isolation. She is able to improve resting tone with increased time between reps. HEP issued to focus on isolation and PF relaxation. Pt questioned if she could do Yoga and [place] exercises. PT confirmed exercise is good and would prefer increased focus on yoga for overall relaxation.      []  Decrease # of leaks   [] No change []  Improving [] Resolved     []  Decrease hypertonus [] No change []  Improving [] Resolved     []  Increase void interval [] No change []  Improving [] Resolved     []  Increase PF strength [] No change []  Improving [] Resolved     []  Increase PF endurance [] No change []  Improving [] Resolved     []  Increase endurance [] No change []  Improving [] Resolved     []  Decrease # of pads [] No change []  Improving [] Resolved     []  Decrease pain [] No change []  Improving [] Resolved     []  Increased coordination [] No change []  Improving [] Resolved     []  Increased Bowel Frequency [] No change []  Improving [] Resolved       Patient will continue to benefit from skilled PT services to modify and progress therapeutic interventions, address functional mobility deficits, address strength deficits, analyze and address soft tissue restrictions, analyze and cue movement patterns, analyze and modify body mechanics/ergonomics, assess and modify postural abnormalities and instruct in home and community integration to attain remaining goals. []  See Plan of Care  [x]  See progress note/recertification  []  See Discharge Summary         Progress towards goals / Updated goals:  1. Patient will demonstrate home exercise program accurately as adjunct to PT clinic visits to promote healthy lifestyle and increase quality of life. Status @ Eval: reviewed next visit  Current status: Progressing 09/15/2020     2. Patient will report ability to utilize [place] with no pain to promote decrease of symptoms and increase quality of life. Status @ Eval: educate as indicated       3. Patient will report no more than 3-4/10 at worst to improve her QOL. Status @ Eval: 7/10 at [address] be accomplished in 8  weeks:  1. Patient will have FOTO score for PFDI Pain decreased by 5-8% points indicating improvement in function and report of no difficulty with maintaining intimate relations due to pain. Status @ Eval: 17     2. Patient will report no more than 0-1//10 at worst to improve her QOL. Status @ Eval: 7/10 at worst      3. Patient will demonstrate 3/5 strength of PF muscles to imp  Status @ Eval: 2/5 with significant compensation form TA and Valsalva Maneuver     4.  Patient will be independent with HEP at time of discharge to be able to continue with pelvic floor program.  Status @ Eval: reviewed next visit     PLAN  []  Upgrade activities as tolerated     [x]  Continue plan of care  []  Update interventions per flow sheet       []  Discharge due to:_  [x]  Other:       Lavaughn Oppenheim, PT 9/15/2020  3:13 PM    Future Appointments   Date Time Provider Naren Khan   9/15/2020  3:45 PM Candice Bower, OTR/L MMCPTPB [facility]   9/16/2020 11:40 AM Frank Davis NP [facility]   9/22/2020 11:15 AM Declan Garsia PT [id number] [facility]   9/22/2020 12:00 PM Tiffany Ghosh, OTR/L [facility]CP[facility]   9/29/2020 11:15 AM Sarah Bower, PT [id number] [facility]   10/6/2020 12:00 PM Sarah Bower, PT [facility]CP[facility]   10/13/2020 12:00 PM Sarah Bower, PT [facility]   10/20/2020 12:00 PM Sarah Bower, PT [facility]CP[facility]   10/27/2020 12:00 PM Sarah Bower, PT [facility]CPTP[facility]

## 2020-09-15 NOTE — PROGRESS NOTES
[facility] Angélica Garcia  [address] [facility]  [phone] fax    Pelvic Floor Progress Note  Patient name: Miguel Galvin Start of Care: 2020   Referral source: Jose Valverde NP : 1985   Medical/Treatment Diagnosis: Pelvic floor dysfunction [M62.89]  Payor: [address] [place] [address] Road / Plan: [address] / Product Type: Managed Care Medicaid /  Onset Date:2020     Prior Hospitalization: see medical history Provider#: [id number]   Medications: Verified on Patient Summary List    Comorbidities: n/a  Prior Level of Function: less pain, mod ind with ADLs/house chores and job duties    Vis[facility] from [facility]: 3    Missed Visits: 0    Established Goals:           Excellent Good         Limited           None  [] Increase Pelvic MM strength []  []  []  []  [] Decrease Pelvic MM hypertonus []  []  [x]  []  [] Decrease Incontinence Episodes []  []  []  []   [] Improve Voiding Habits  []  []  []  []  [] Decreased Urgency   []  []  []  []    Key Functional Changes: Mrs. Juanita Khan has only been seen for two visits since the initial evaluation. She has made limited progress, but demonstrate good PF awareness. She has increased resting tone contributing to pain and carries tension in her PF muscles. She has good isolation of the PF and demonstrates no compensatory techniques. PF endurance is limited contributing to UI. Continued PT indicated to improve PF function, reduce pain, and reduce UI. Updated Goals: to be achieved in 6 weeks:  Goal 1:  Patient will improve FOTO score for PFDI Pain decreased by 5-8% points indicating improvement in function and decreased pain with intercourse. Goal 2: Patient to report 5/10 pelvic pain or less with daily activities. Goal 3: Patient to report decreased episodes of UI to three times per week or less. Goal 4: Patient will report 50% or greater overall improvement. ASSESSMENT/RECOMMENDATIONS:  []Continue therapy per initial plan/protocol at a frequency of  1 x per week for 6 weeks  []Continue therapy with the following recommended changes:_____________________      _____________________________________________________________________  []Discontinue therapy progressing towards or have reached established goals  []Discontinue therapy due to lack of appreciable progress towards goals  []Discontinue therapy due to lack of attendance or compliance  []Await Physician's recommendations/decisions regarding therapy  []Other:________________________________________________________________    Thank you for this referral.   Antonieta Llanes, PT 9/15/2020 4:25 PM  NOTE TO PHYSICIAN:  [address] AND   FAX TO [facility]: [phone]  If you are unable to process this request in 24 hours please contact our office: [phone] I have read the above report and request that my patient continue as recommended. ? I have read the above report and request that my patient continue therapy with the following changes/special instructions:___________________________________________________________  ? I have read the above report and request that my patient be discharged from therapy.     [de-identified] Signature:____________Date:_________TIME:________    [employer], Date and Time must be completed for valid certification **

## 2020-09-15 NOTE — PROGRESS NOTES
OT DAILY TREATMENT NOTE 10-18    Patient Name: Lulú Foster  Date:9/15/2020  : 1985  [x]  Patient  Verified  Payor: [address] [place] [address] Road / Plan: [address] / Product Type: Managed Care Medicaid /    In time:350  Out time:430  Total Treatment Time (min): 40  Visit #: 3 of 4    Treatment Area: Pain in right arm [M79.601]  Pain in right wrist [M25.531]  Thumb pain [M79.646]    SUBJECTIVE  Pain Level (0-10 scale): 3-4/10  Any medication changes, allergies to medications, adverse drug reactions, diagnosis change, or new procedure performed?: [x] No    [] Yes (see summary sheet for update)  Subjective functional status/changes:   [] No changes reported  Feels better.   I try to think about how I lift etc    OBJECTIVE    Modality rationale: decrease inflammation and decrease pain to improve the patients ability to move wrist   Min Type Additional Details    [] Estim:  []Unatt       []IFC  []Premod                        []Other:  []w/ice   []w/heat  Position:  Location:    [] Estim: []Att    []TENS instruct  []NMES                    []Other:  []w/US   []w/ice   []w/heat  Position:  Location:    []  Traction: [] Cervical       []Lumbar                       [] Prone          []Supine                       []Intermittent   []Continuous Lbs:  [] before manual  [] after manual    []  Ultrasound: []Continuous   [] Pulsed                           []1MHz   []3MHz W/cm2:  Location:    []  Iontophoresis with dexamethasone         Location: [] Take home patch   [] In clinic    []  Ice     []  heat  []  Ice massage  []  Laser   []  Paraffin Position:  Location:   8 [x]  Laser with stim  []  Other:  Position:  Location:radial wrist right    []  Vasopneumatic Device Pressure:       [] lo [] med [] hi   Temperature: [] lo [] med [] hi       [x] Skin assessment post-treatment:  [x]intact []redness- no adverse reaction    []redness  adverse reaction:       32 min Therapeutic Exercise:  [] See flow sheet :   Rationale: increase strength to improve the patients ability to grasp, lift  4# wrist flex ext x 15  Green therabar x 10 flex ext and sup pro  Gripper 55# x 25 1 inch pegs      With   [] TE   [] TA   [] neuro   [] other: Patient Education: [x] Review HEP    [] Progressed/Changed HEP based on: respect pain  [] positioning   [] body mechanics   [] transfers   [] heat/ice application   [] Splint wear/care   [] Sensory re-education   [] scar management      [] other:            Other Objective/Functional Measures:   Difficulty with pronation with green thrabar     Pain Level (0-10 scale) post treatment: 2/10    ASSESSMENT/Changes in Function: Pain declining, improving strength, function    Patient will continue to benefit from skilled OT services to modify and progress therapeutic interventions, address strength deficits, analyze and cue movement patterns and instruct in home and community integration to attain remaining goals. []  See Plan of Care  []  See progress note/recertification  []  See Discharge Summary         Progress towards goals / Updated goals:  1.  Patient will increase right wrist strength to at least 4+/5 for lifting and carrying activities. Status at Last PN:  4   9/10/2020- Progressing in clinic with wrist strengthening activity. , tolerated 4# weight 9/15/2020     2.  Patient will report pain in right wrist decreased to 2/10 with routine childcare and home care tasks. Status last PN:  5/10  Status last visit: pain 3-4/10 at beginning, 2/10 at end 9/15/2020     3.  Patient will increase right   by at least 20# for return to dominance for opening jars and cutting foods. Status last PN- 45 ( +13)  9/10/2020- Progressing in clinic with hand strengthening activity. , used 55# gripper without pain 9/15/2020      5.  Patient will improve use of right hand for carrying and lifting as shown by increase in FOTO of at least 15 points.   Status Last PN: 46 (NT for recheck)  9/10/2020- Education provided on joint protection and proper carrying techniques.     PLAN  []  Upgrade activities as tolerated     []  Continue plan of care  []  Update interventions per flow sheet       []  Discharge due to:_  []  Other:_      Jay Ochoa, OTR/L 9/15/2020  3:16 PM    Future Appointments   Date Time Provider Naren Khan   9/15/2020  3:45 PM Maciej Dean, OTR/L [facility]CPTP[facility]   9/16/2020 11:40 AM Cristhian Trinh NP [facility]   9/22/2020 11:15 AM Raymundo Sterling, PT [facility]CPTP[facility]   9/22/2020 12:00 PM Maciej Dean, OTR/L [facility]CPTP[facility]   9/29/2020 11:15 AM Raymundo Sterling, PT [facility]GB[facility]   10/6/2020 12:00 PM Raymundo Sterling, PT [facility]CP[facility]   10/13/2020 12:00 PM Raymundo Sterling, PT [facility]   10/20/2020 12:00 PM Raymundo Sterling, PT [facility]   10/27/2020 12:00 PM Raymundo Sterling, PT [facility]CPTP[facility]

## 2020-09-16 ENCOUNTER — OFFICE VISIT (OUTPATIENT)
Dept: FAMILY MEDICINE CLINIC | Age: 35
End: 2020-09-16

## 2020-09-16 VITALS
HEART RATE: 72 BPM | DIASTOLIC BLOOD PRESSURE: 77 MMHG | BODY MASS INDEX: 32.82 KG/M2 | SYSTOLIC BLOOD PRESSURE: 133 MMHG | HEIGHT: 63 IN | OXYGEN SATURATION: 98 % | RESPIRATION RATE: 18 BRPM | TEMPERATURE: 97.1 F | WEIGHT: 185.2 LBS

## 2020-09-16 DIAGNOSIS — R21 RASH OF HANDS: Primary | ICD-10-CM

## 2020-09-16 RX ORDER — KETOCONAZOLE 20 MG/G
CREAM TOPICAL DAILY
Qty: 1 TUBE | Refills: 0 | Status: SHIPPED | OUTPATIENT
Start: 2020-09-16 | End: 2020-10-15

## 2020-09-16 NOTE — PATIENT INSTRUCTIONS
Rash: Care Instructions Your Care Instructions A rash is any irritation or inflammation of the skin. Rashes have many possible causes, including allergy, infection, illness, heat, and emotional stress. Follow-up care is a key part of your treatment and safety. Be sure to make and go to all appointments, and call your doctor if you are having problems. It's also a good idea to know your test results and keep a list of the medicines you take. How can you care for yourself at home? · Wash the area with water only. Soap can make dryness and itching worse. Pat dry. · Put cold, wet cloths on the rash to reduce itching. · Keep cool, and stay out of the sun. · Leave the rash open to the air as much of the time as possible. · Sometimes petroleum jelly (Vaseline) can help relieve the discomfort caused by a rash. A moisturizing lotion, such as Cetaphil, also may help. Calamine lotion may help for rashes caused by contact with something (such as a plant or soap) that irritated the skin. Use it 3 or 4 times a day. · If your doctor prescribed a cream, use it as directed. If your doctor prescribed medicine, take it exactly as directed. · If your rash itches so badly that it interferes with your normal activities, take an over-the-counter antihistamine, such as diphenhydramine (Benadryl) or loratadine (Claritin). Read and follow all instructions on the label. When should you call for help? Call your doctor now or seek immediate medical care if: 
  · You have signs of infection, such as: 
? Increased pain, swelling, warmth, or redness. ? Red streaks leading from the area. ? Pus draining from the area. ? A fever.  
  · You have joint pain along with the rash. Watch closely for changes in your health, and be sure to contact your doctor if: 
  · Your rash is changing or getting worse. For example, call if you have pain along with the rash, the rash is spreading, or you have new blisters.   · You do not get better after 1 week. Where can you learn more? Go to http://www.gray.com/ Enter T395 in the search box to learn more about \"Rash: Care Instructions. \" Current as of: July 2, 2020               Content Version: 12.6 © 3455-1967 [employer]. Care instructions adapted under license by [employer] (which disclaims liability or warranty for this information). If you have questions about a medical condition or this instruction, always ask your healthcare professional. [address] any warranty or liability for your use of this information.

## 2020-09-16 NOTE — PROGRESS NOTES
Chantelle Lacey presents today for   Chief Complaint   Patient presents with    Rash     on hand       Is someone accompanying this pt? no    Is the patient using any DME equipment during OV? no    Depression Screening:  3 most recent PHQ Screens 9/16/2020   Little interest or pleasure in doing things Not at all   Feeling down, depressed, irritable, or hopeless Not at all   Total Score PHQ 2 0       Learning Assessment:  No flowsheet data found. Health Maintenance reviewed and discussed and ordered per Provider. Health Maintenance Due   Topic Date Due    PAP AKA CERVICAL CYTOLOGY  03/05/2006    Flu Vaccine (1) 09/01/2020   . Coordination of Care:  1. Have you been to the ER, urgent care clinic since your last visit? Hospitalized since your last visit? no    2. Have you seen or consulted any other health care providers outside of the [address] [place] [address] since your last visit? Include any pap smears or colon screening.  no

## 2020-09-16 NOTE — PROGRESS NOTES
General Office Visit Note      Patient:  Davin Sosa    Subjective:     Brenna Liu is a 28 y.o. y.o. female who complains of   Chief Complaint   Patient presents with    Rash     on hand     Davin Sosa  presents complaining of a rash. The patients reports the rash began about 1 year ago and progressed over the last 4-5 months. The patients reports the rash is located on her right hand and is red, itchy and spreading and is pruitic. The patient has tried essential, eczema cream, antibiotic ointment with no improvement. The patient denies any new exposures, including, soaps, detergents, foods, medications etc.   The patient denies any prior history of similar rash. The patient denies any fevers. Past Medical History:   Diagnosis Date    History of pre-eclampsia     Pelvic pain      Past Surgical History:   Procedure Laterality Date    HX APPENDECTOMY  2013    HX WISDOM TEETH EXTRACTION  2004     Social History     Socioeconomic History    Marital status:      Spouse name: Not on file    Number of children: Not on file    Years of education: Not on file    Highest education level: Not on file   Tobacco Use    Smoking status: Never Smoker    Smokeless tobacco: Never Used   Substance and Sexual Activity    Alcohol use: Not Currently    Drug use: Never    Sexual activity: Yes     Partners: Male     Birth control/protection: Condom     Current Outpatient Medications   Medication Sig Dispense Refill    mv-mn/folic ac/calcium/vit K1 (WOMEN'S 50 PLUS DAILY FORMULA PO) Take  by mouth.  ketoconazole (NIZORAL) 2 % topical cream Apply  to affected area daily for 7 days. 1 Tube 0     Allergies   Allergen Reactions    Cephalexin Hives    Gluten Diarrhea and Itching     The patient has a family history of    REVIEW OF SYSTEMS  Review of Systems   Skin: Positive for rash.        Objective:     Visit Vitals  /77   Pulse 72   Temp 97.1 °F (36.2 °C) (Skin)   Resp 18   Ht 5' 3.39\" (1.61 m)   Wt 185 lb 3.2 oz (84 kg)   SpO2 98%   BMI 32.41 kg/m²       Current Outpatient Medications   Medication Instructions    ketoconazole (NIZORAL) 2 % topical cream Topical, DAILY    mv-mn/folic ac/calcium/vit K1 (WOMEN'S 50 PLUS DAILY FORMULA PO) Oral        PHYSICAL EXAM  Physical Exam  Vitals signs and nursing note reviewed. Constitutional:       Appearance: Normal appearance. Cardiovascular:      Rate and Rhythm: Normal rate and regular rhythm. Pulses: Normal pulses. Heart sounds: Normal heart sounds. Pulmonary:      Effort: Pulmonary effort is normal.      Breath sounds: Normal breath sounds. Skin:     General: Skin is warm and dry. Findings: Rash present. Neurological:      General: No focal deficit present. Mental Status: She is alert and oriented to person, place, and time. Psychiatric:         Mood and Affect: Mood normal.         Behavior: Behavior normal.       Assessment/Plan:     Diagnoses and all orders for this visit:    1. Rash of hands  -     ketoconazole (NIZORAL) 2 % topical cream; Apply  to affected area daily for 7 days. Follow-up and Dispositions    · Return if symptoms worsen or fail to improve. Disclaimer:    I have discussed the diagnosis with the patient and the intended plan as seen above. The patient understands our medical plan. The risks, benefits and significant side effects of all medications have been reviewed. Anticipated time course and progression of condition reviewed. All questions have been addressed. She received an after visit summary, with information reviewed, and questions answered. Where appropriate, she is instructed to call the clinic if she has not been notified either by phone or through [address] with the results of her tests or with an appointment plan for any referrals within 1 week(s). The patient  is to call if her condition worsens or fails to improve or if significant side effects are experienced.        Maggie Martin, NP

## 2020-09-18 ENCOUNTER — TELEPHONE (OUTPATIENT)
Dept: FAMILY MEDICINE CLINIC | Age: 35
End: 2020-09-18

## 2020-09-18 DIAGNOSIS — R21 RASH OF HANDS: Primary | ICD-10-CM

## 2020-09-18 RX ORDER — TRIAMCINOLONE ACETONIDE 5 MG/G
CREAM TOPICAL 2 TIMES DAILY
Qty: 15 G | Refills: 0 | Status: SHIPPED | OUTPATIENT
Start: 2020-09-18 | End: 2020-10-15

## 2020-09-18 NOTE — TELEPHONE ENCOUNTER
patient had an rash on hand and picked up the cream (ketoconazole) that was prescribed , patient is now having an allergic reaction from the cream.

## 2020-09-18 NOTE — TELEPHONE ENCOUNTER
States she has been experiencing some itching since starting ketoconazole. Patient requesting new medication to be sent to the pharmacy.

## 2020-09-22 ENCOUNTER — HOSPITAL ENCOUNTER (OUTPATIENT)
Dept: PHYSICAL THERAPY | Age: 35
Discharge: HOME | End: 2020-09-22
Payer: COMMERCIAL

## 2020-09-22 PROCEDURE — 97032 APPL MODALITY 1+ESTIM EA 15: CPT

## 2020-09-22 PROCEDURE — 97110 THERAPEUTIC EXERCISES: CPT

## 2020-09-22 PROCEDURE — 97112 NEUROMUSCULAR REEDUCATION: CPT

## 2020-09-22 PROCEDURE — 97140 MANUAL THERAPY 1/> REGIONS: CPT

## 2020-09-22 PROCEDURE — 97530 THERAPEUTIC ACTIVITIES: CPT

## 2020-09-22 NOTE — PROGRESS NOTES
[facility]  [address] [facility]  [phone] fax    Occupational Therapy Discharge Summary    Patient name: Bijan Welch Start of Care: 2020   Referral source: Aaliyah Flores NP : 1985   Medical/Treatment Diagnosis: Pain in right arm [M79.601]  Pain in right wrist [M25.531]  Thumb pain [M79.646]  Payor: [address] [place] [address] Road / Plan: [address] / Product Type: Managed Care Medicaid /  Onset Date:Several Months     Prior Hospitalization: see medical history Provider#: [id number]   Medications: Verified on Patient Summary List    Comorbidities:  Right shoulder pain intermittent  Prior Level of Function:5 children (16,12,7,4,2), I self care, home care, driving, activities, volleyball sports                    Visits from Start of Care: 8    Missed Visits: 0  Reporting Period : 9/3//2020 to 2020      Summary of Care:   Client began care on 2020 with pain in right wrist preventing her from attending to her home responsibilities. Therapeutic exercises given to increase right wrist ROM along with progressively more challenging wrist strengthening exercises. Modalities provided and client educated on joint protection strategies. Client given HEP to increase wrist strength and ROM. 1.  Patient will increase right wrist strength to at least 4+/5 for lifting and carrying activities. Status at Last PN:  4   Discharge status: MMT for wrist flexion/extension (5) and radial (4+) and ulnar deviation (5). Goal met.     2.  Patient will report pain in right wrist decreased to 2/10 with routine childcare and home care tasks. Status last PN:  5/10  Discharge status: Pain decreased to 3. Progressing towards this goal.     3.  Patient will increase right   by at least 20# for return to dominance for opening jars and cutting foods.   Status last PN- 45 ( +13)  Discharge status:  strength improved to 75# surpassing goal of 52#. Goal met.      4.  Patient will improve use of right hand for carrying and lifting as shown by increase in FOTO of at least 15 points. Status Last PN: 46 (NT for recheck)  Discharge status: FOTO score of 72 surpassed goal of 61. Goal met. ASSESSMENT/Changes in Function: Client has increased  and wrist strength, increased wrist ROM, and experienced a gradual decrease in pain. Client is able to perform everyday activities requiring wrist and  strength. ASSESSMENT/RECOMMENDATIONS:  [x]Discontinue therapy: [x]Patient has reached or is progressing toward set goals      []Patient is non-compliant or has abdicated      []Due to lack of appreciable progress towards set goals    Narcisa Garcia OTS 9/22/2020 2:39 PM    Beth Oneal OTR/L    NOTE TO PHYSICIAN:  Please complete the following and fax to: [facility] at [facility] at [phone]  . Retain this original for your records. If you are unable to process this request in   24 hours, please contact our office.      [] I have read the above report and request that my patient continue therapy with the following changes/special instructions:  [] I have read the above report and request that my patient be discharged from therapy    Physician's Signature:____________Date:_________TIME:________    ** Signature, Date and Time must be completed for valid certification **

## 2020-09-22 NOTE — PROGRESS NOTES
OT DAILY TREATMENT NOTE 10-18    Patient Name: Chu Mancuso  Date:2020  : 1985  [x]  Patient  Verified  Payor: [address] [place] [address] Road / Plan: [address] / Product Type: Managed Care Medicaid /    In time:12:04  Out time:12:50  Total Treatment Time (min): 55  Visit #: 8 of 8    Treatment Area: Pain in right arm [M79.601]  Pain in right wrist [M25.531]  Thumb pain [M79.646]    SUBJECTIVE  Pain Level (0-10 scale): 4  Any medication changes, allergies to medications, adverse drug reactions, diagnosis change, or new procedure performed?: [x] No    [] Yes (see summary sheet for update)  Subjective functional status/changes:   [] No changes reported  Right hand was aggravated a couple days ago from lifting one of her children. Speculated her wrist pain originated from a  job that she performed last year where she was carrying many heavy items. OBJECTIVE    Modality rationale: decrease inflammation and decrease pain to improve the patients ability to move her wrist for everyday activities.    Min Type Additional Details    [] Estim:  []Unatt       []IFC  []Premod                        []Other:  []w/ice   []w/heat  Position:  Location:    [] Estim: []Att    []TENS instruct  []NMES                    []Other:  []w/US   []w/ice   []w/heat  Position:  Location:    []  Traction: [] Cervical       []Lumbar                       [] Prone          []Supine                       []Intermittent   []Continuous Lbs:  [] before manual  [] after manual    []  Ultrasound: []Continuous   [] Pulsed                           []1MHz   []3MHz W/cm2:  Location:    []  Iontophoresis with dexamethasone         Location: [] Take home patch   [] In clinic    []  Ice     []  heat  []  Ice massage  []  Laser   []  Paraffin Position:  Location:   8 [x]  Laser with stim  []  Other:  Position:   Location: radial wrist right    []  Vasopneumatic Device Pressure:       [] lo [] med [] hi   Temperature: [] lo [] med [] hi       [x] Skin assessment post-treatment:  [x]intact []redness- no adverse reaction    []redness  adverse reaction:       38 min Therapeutic Exercise:  [] See flow sheet :   Rationale: increase ROM and increase strength to improve the patients ability to grasp and lift with right hand/wrist.    Right Wrist:  Wrist maze (both) X10 each  Wrist flexion and extension 4# weight X15  Mild (Yellow) Thera-putty manipulation X10 1/4\" pegs  Yellow Therabar X15  Wrist Well 1# weight repeatative wrist flexion and extension X5      With   [x] TE   [] TA   [] neuro   [] other: Patient Education: [x] Review HEP  For Discharge Instructions  [] Progressed/Changed HEP based on:   [] positioning   [] body mechanics   [] transfers   [] heat/ice application   [] Splint wear/care   [] Sensory re-education   [] scar management      [] other:            Other Objective/Functional Measures:   Unable to tolerate green therabar.  Measurements: Taken with Corona Dynamometer, in Lbs    7/27 9/1 GOAL 9/22    Right 32 45 +20# = 52 75    Left 52 - - 75           Manual Muscle Testing Right  Wrist Flexion 5   Wrist Extension 5  Radial Deviation 4+  Ulnar Deviation 5     FOTO   7/27 GOAL 9/22     Score 46 +15=61 72                 Pain Level (0-10 scale) post treatment: 3/10    ASSESSMENT/Changes in Function: Improving  strength and wrist function. []  See Plan of Care  []  See progress note/recertification  [x]  See Discharge Summary         Progress towards goals / Updated goals:  1.  Patient will increase right wrist strength to at least 4+/5 for lifting and carrying activities. Status at Last PN:  4   Discharge status: MMT for wrist flexion/extension (5) and radial (4+) and ulnar deviation (5). Goal met. 2.  Patient will report pain in right wrist decreased to 2/10 with routine childcare and home care tasks. Status last PN:  5/10  Discharge status: Pain decreased to 3. Progressing towards this goal.     3.  Patient will increase right   by at least 20# for return to dominance for opening jars and cutting foods. Status last PN- 45 ( +13)  Discharge status:  strength improved to 75# surpassing goal of 52#. Goal met.      4.  Patient will improve use of right hand for carrying and lifting as shown by increase in FOTO of at least 15 points. Status Last PN: 46 (NT for recheck)  Discharge status: FOTO score was 72. Goal met. PLAN  []  Upgrade activities as tolerated     []  Continue plan of care  []  Update interventions per flow sheet       [x]  Discharge due to:3 out of 4 goals met. Progressing towards reduced pain goal. Joint protection strategies hand out given to address unmet goal.  Discussed importance of asking for help when needed.       []  Other:_      Richie Romero OTR/MARLYN Huerta Parkinson OTS 9/22/2020  11:43 AM    Future Appointments   Date Time Provider Naren Khan   9/22/2020 12:00 PM [facility]   9/29/2020 11:15 AM Nannette Reis, PT PEOSYKM [facility]   10/6/2020 12:00 PM Nannette Reis, PT [facility]   10/13/2020  4:30 PM Nannette Reis, PT [facility]   10/20/2020 12:00 PM Nannette Reis, PT [facility]   10/21/2020 10:00 AM Sunny Perez NP [facility] BS AMB   10/27/2020 12:00 PM Nannette Reis, PT [facility]

## 2020-09-22 NOTE — PROGRESS NOTES
PF DAILY TREATMENT NOTE 3-16    Patient Name: Natalee Raymond  Date:2020  : 1985  [x]  Patient  Verified  Payor: [address] [place] [address] Road / Plan: [address] / Product Type: Managed Care Medicaid /    In time:1129  Out time:1201  Total Treatment Time (min): 32  Visit #: 1 of 6    Medicare/BCBS Only   Total Timed Codes (min):   1:1 Treatment Time:       Treatment Area: [x] Pelvic Floor     [] Other:    SUBJECTIVE  Pain Level (0-10 scale): 0/10  Any medication changes, allergies to medications, adverse drug reactions, diagnosis change, or new procedure performed?: [x] No    [] Yes (see summary sheet for update)  Subjective functional status/changes:   [] No changes reported  Pt reports she did have some pelvic pain over the past couple of weeks and just recently stopped breastfeeding, but she has no pelvic pain. Pt states her period was several weeks late but now that she got her period it has improved.      OBJECTIVE    Modality rationale:    Min Type Additional Details    [] Estim:  []Unatt       []IFC  []Premod                        []Other:  []w/ice   []w/heat  Position:  Location:    [] Estim: []Att    []TENS instruct  []NMES                    []Other:  []w/US   []w/ice   []w/heat  Position:  Location:    []  Ultrasound: []Continuous   [] Pulsed                           []1MHz   []3MHz Position:  Location:    []  Ice     []  heat  []  Ice massage  []  Laser   []  Anodyne Position:  Location:   [] Skin assessment post-treatment:  []intact []redness- no adverse reaction    []redness  adverse reaction:         14 min Therapeutic Exercise:  [] See flow sheet :   []  Pelvic floor strengthening                 [x]  Pelvic floor downtraining  [x]  Quality pelvic floor contractions       [x]  Relaxation techniques  []  Urge suppression exercises  []  Other:  Rationale: increase strength, improve coordination and increase PF relaxation  to improve the patients ability to reduce UI frequency and improve QOL       8 min Neuromuscular Re-educatoin:  [x]  See flow sheet : Palpation and TPR to abdomen and obliques B    [x]  Increase Tissue extensibility        []  Assess fiber intake    []  Assess voiding habits  []  Assess bowel habits  [x]  Other: improve core stability and PF awareness   Rationale: increase ROM and PF and abdominal muscle relaxation  to improve the patients ability to reduce pelvic and abdominal pain for I with ADLs    10 min Therapeutic activity: to assess voiding habits  Rationale: to increased PF awareness and coordination to reduce UI frequency and improve QOL. With   [x] TE   [x] TA   [] neuro  [] manual   [] other: Patient Education: [x] Review HEP    [x] Progressed/Changed HEP based on:   [x] positioning   [] body mechanics   [] transfers   [] heat/ice application    [] other: no change to current HEP     Other Objective/Functional Measures:   Pt needs Verbal cues to engage PF with exhale and to not hold breath  Timed relaxation so pt would not carry muscle tension  Palpable scar adhesions at abdomen left side > right    Pain Level (0-10 scale) post treatment: 0/10    ASSESSMENT/Changes in Function: Mrs. Parminder Naranjo is doing well with PT. She is doing her HEP more consistently and reports less pain.      []  Decrease # of leaks   [] No change []  Improving [] Resolved     []  Decrease hypertonus [] No change []  Improving [] Resolved     []  Increase void interval [] No change []  Improving [] Resolved     []  Increase PF strength [] No change []  Improving [] Resolved     []  Increase PF endurance [] No change []  Improving [] Resolved     []  Increase endurance [] No change []  Improving [] Resolved     []  Decrease # of pads [] No change []  Improving [] Resolved     []  Decrease pain [] No change []  Improving [] Resolved     []  Increased coordination [] No change []  Improving [] Resolved     []  Increased Bowel Frequency [] No change []  Improving [] Resolved Patient will continue to benefit from skilled PT services to modify and progress therapeutic interventions, address functional mobility deficits, address strength deficits, analyze and address soft tissue restrictions, analyze and cue movement patterns, analyze and modify body mechanics/ergonomics and instruct in home and community integration to attain remaining goals. []  See Plan of Care  []  See progress note/recertification  []  See Discharge Summary         Progress towards goals / Updated goals:  Goal 1:  Patient will improve FOTO score for PFDI Pain decreased by 5-8% points indicating improvement in function and decreased pain with intercourse. Goal 2: Patient to report 5/10 pelvic pain or less with daily activities. Pain past few visits 3-5/10  09/22/2020  Goal 3: Patient to report decreased episodes of UI to three times per week or less. Goal 4: Patient will report 50% or greater overall improvement.      PLAN  []  Upgrade activities as tolerated     [x]  Continue plan of care  []  Update interventions per flow sheet       []  Discharge due to:_  []  Other:_      Omari Oneil, PT 9/22/2020  11:32 AM    Future Appointments   Date Time Provider Naren Khan   9/22/2020 12:00 PM KORIN Cardoso/MARLYN [facility]   9/29/2020 11:15 AM Sarah Bower, PT WGGDKLY [facility]   10/6/2020 12:00 PM Sarah Cissenight, PT [facility]   10/13/2020  4:30 PM Sarah Bower, PT [facility]   10/20/2020 12:00 PM Sarah Cissenight, PT [facility]   10/21/2020 10:00 AM Alejandro De La Fuente, CHELSEA [facility] BS AMB   10/27/2020 12:00 PM Sarah Bower, PT [facility]

## 2020-09-29 ENCOUNTER — APPOINTMENT (OUTPATIENT)
Dept: PHYSICAL THERAPY | Age: 35
End: 2020-09-29
Payer: COMMERCIAL

## 2020-09-29 ENCOUNTER — HOSPITAL ENCOUNTER (OUTPATIENT)
Dept: PHYSICAL THERAPY | Age: 35
Discharge: HOME | End: 2020-09-29
Payer: COMMERCIAL

## 2020-09-29 PROCEDURE — 97112 NEUROMUSCULAR REEDUCATION: CPT

## 2020-09-29 PROCEDURE — 97110 THERAPEUTIC EXERCISES: CPT

## 2020-09-29 PROCEDURE — 97530 THERAPEUTIC ACTIVITIES: CPT

## 2020-09-29 NOTE — PROGRESS NOTES
PF DAILY TREATMENT NOTE 3-16    Patient Name: Nilson Swenson  Date:2020  : 1985  [x]  Patient  Verified  Payor: [address] [place] [address] Road / Plan: [address] / Product Type: Managed Care Medicaid /    In time:1127  Out time:1214  Total Treatment Time (min): 38  Visit #: 2 of 6    Medicare/BCBS Only   Total Timed Codes (min):   1:1 Treatment Time:       Treatment Area: [x] Pelvic Floor     [] Other:    SUBJECTIVE  Pain Level (0-10 scale): 3/10  Any medication changes, allergies to medications, adverse drug reactions, diagnosis change, or new procedure performed?: [x] No    [] Yes (see summary sheet for update)  Subjective functional status/changes:   [] No changes reported  Pt reports going bike riding last night but no increased pain. Pt states she has had abdominal pain/cramping this week. Does not feel like gas and she does not know what it is related to. She states it's in \"random spots\" and goes away on it/s own. Pt states that doing her HEP does help her to relax and relieve the pain.      OBJECTIVE    Modality rationale:    Min Type Additional Details    [] Estim:  []Unatt       []IFC  []Premod                        []Other:  []w/ice   []w/heat  Position:  Location:    [] Estim: []Att    []TENS instruct  []NMES                    []Other:  []w/US   []w/ice   []w/heat  Position:  Location:    []  Ultrasound: []Continuous   [] Pulsed                           []1MHz   []3MHz Position:  Location:    []  Ice     []  heat  []  Ice massage  []  Laser   []  Anodyne Position:  Location:   [] Skin assessment post-treatment:  []intact []redness- no adverse reaction    []redness  adverse reaction:         15 min Therapeutic Exercise:  [] See flow sheet :   []  Pelvic floor strengthening                 []  Pelvic floor downtraining  [x]  Quality pelvic floor contractions       []  Relaxation techniques  []  Urge suppression exercises  []  Other:  Rationale: increase strength and improve coordination  to improve the patients ability to reduce episodes of UI and improve QOL       15 min Therapeutic Activity:  []  See flow sheet :    []  Increase Tissue extensibility        []  Assess fiber intake    []  Assess voiding habits  []  Assess bowel habits  []  Other:   Rationale: improve coordination and improve PF awareness and PF relaxation  to improve the patients ability to reduce pain with ADLs and improve QOL      8 min Neuromuscular Re-education:  []  See flow sheet :   []  Pelvic floor strengthening                 [x]  Pelvic floor downtraining  []  Quality pelvic floor contractions       [x]  Relaxation techniques  []  Urge suppression exercises  [x]  Other: palpation of abdominal scar tissues and TPR  Rationale: increase strength, improve coordination and improve PF relaxation  to improve the patients ability to reduce pain with ADLs and intercourse to improve QOL            With   [x] TE   [x] TA   [x] neuro  [] manual   [] other: Patient Education: [x] Review HEP    [] Progressed/Changed HEP based on:   [x] positioning   [x] body mechanics   [] transfers   [] heat/ice application    [x] other: encouraged pt to continue with HEP to include stretching and deep breathing for relaxation     Other Objective/Functional Measures:   Pt reports increased pain with palpation of abdominal scar tissue  Pain reduced with exercise and at end or PT session      Pain Level (0-10 scale) post treatment: 2/10    ASSESSMENT/Changes in Function: Pt progressing slowly with PT. Pt encouraged to be mindful of things contributing to pain and what provides relief. Will continue to progress with gently PF isolation exercises for improved awareness and PF relaxation for reduced pain.      []  Decrease # of leaks   [] No change []  Improving [] Resolved     []  Decrease hypertonus [] No change []  Improving [] Resolved     []  Increase void interval [] No change []  Improving [] Resolved     []  Increase PF strength [] No change []  Improving [] Resolved     []  Increase PF endurance [] No change []  Improving [] Resolved     []  Increase endurance [] No change []  Improving [] Resolved     []  Decrease # of pads [] No change []  Improving [] Resolved     []  Decrease pain [] No change []  Improving [] Resolved     []  Increased coordination [] No change []  Improving [] Resolved     []  Increased Bowel Frequency [] No change []  Improving [] Resolved       Patient will continue to benefit from skilled PT services to modify and progress therapeutic interventions, address functional mobility deficits, address ROM deficits, address strength deficits, analyze and address soft tissue restrictions, analyze and cue movement patterns, analyze and modify body mechanics/ergonomics and instruct in home and community integration to attain remaining goals. []  See Plan of Care  []  See progress note/recertification  []  See Discharge Summary         Progress towards goals / Updated goals:  Goal 1:  Patient will improve FOTO score for PFDI Pain decreased by 5-8% points indicating improvement in function and decreased pain with intercourse. Goal 2: Patient to report 5/10 pelvic pain or less with daily activities. Pain past few visits 3-5/10  09/22/2020  Goal 3: Patient to report decreased episodes of UI to three times per week or less. Goal 4: Patient will report 50% or greater overall improvement. Slowly improving and reports of decreased pain last 2 visits.  09/29/2020    PLAN  []  Upgrade activities as tolerated     [x]  Continue plan of care  []  Update interventions per flow sheet       []  Discharge due to:_  []  Other:_      Agnieszka Harrison, PT 9/29/2020  11:27 AM    Future Appointments   Date Time Provider Naren Khan   10/13/2020  4:30 PM Pete Win, PT [id number] [facility]   10/20/2020 12:00 PM Pete Win PT [facility]   10/21/2020 10:00 AM Curtis Kidney, NP [facility] BS AMB   10/27/2020 12:00 PM Estevan Mendoza, PT [facility]

## 2020-10-06 ENCOUNTER — APPOINTMENT (OUTPATIENT)
Dept: PHYSICAL THERAPY | Age: 35
End: 2020-10-06
Payer: COMMERCIAL

## 2020-10-13 ENCOUNTER — HOSPITAL ENCOUNTER (OUTPATIENT)
Dept: PHYSICAL THERAPY | Age: 35
Discharge: HOME | End: 2020-10-13
Payer: COMMERCIAL

## 2020-10-13 PROCEDURE — 97110 THERAPEUTIC EXERCISES: CPT

## 2020-10-13 PROCEDURE — 97112 NEUROMUSCULAR REEDUCATION: CPT

## 2020-10-13 PROCEDURE — 97530 THERAPEUTIC ACTIVITIES: CPT

## 2020-10-13 NOTE — PROGRESS NOTES
PF DAILY TREATMENT NOTE 3-16    Patient Name: Nilson Swenson  Date:10/13/2020  : 1985  [x]  Patient  Verified  Payor: [address] [place] [address] Road / Plan: [employer] [address] / Product Type: Managed Care Medicaid /    In time:440  Out time:526  Total Treatment Time (min): 55  Visit #: 3 of 6    Medicare/BCBS Only   Total Timed Codes (min):   1:1 Treatment Time:       Treatment Area: [x] Pelvic Floor     [] Other:    SUBJECTIVE  Pain Level (0-10 scale): 2/10  Any medication changes, allergies to medications, adverse drug reactions, diagnosis change, or new procedure performed?: - No    - Yes (see summary sheet for update)  Subjective functional status/changes:   - No changes reported  Pt reports she went to the MD for her annual exam and states it was very painful. She said the MD recommended she continued PT    Pt states the pain is not to high today but has been a 5/10 earlier in the week. She states sex is a little more uncomfortable this last week. Pt reports inconsistent compliance with her HEP.  She thinks she       OBJECTIVE    Modality rationale: decrease pain and increase tissue extensibility to improve the patients ability to increase I with ADLs   Min Type Additional Details    [] Estim:  []Unatt       []IFC  []Premod                        []Other:  []w/ice   []w/heat  Position:  Location:    [] Estim: []Att    []TENS instruct  []NMES                    []Other:  []w/US   []w/ice   []w/heat  Position:  Location:    []  Ultrasound: []Continuous   [] Pulsed                           []1MHz   []3MHz Position:  Location:   10 min during TE []  Ice     [x]  heat  []  Ice massage  []  Laser   []  Anodyne Position:supine  Location: L/S and anterior pelvis   [] Skin assessment post-treatment:  []intact []redness- no adverse reaction    []redness  adverse reaction:         16 min Therapeutic Exercise:  [] See flow sheet :   []  Pelvic floor strengthening                 [x]  Pelvic floor downtraining  [x]  Quality pelvic floor contractions       [x]  Relaxation techniques  []  Urge suppression exercises  []  Other:  Rationale: increase strength, improve coordination and improve PF awareness  to improve the patients ability to reduce UI and pain for improved QOL       15 min Therapeutic Activity:  []  See flow sheet :    []  Increase Tissue extensibility        []  Assess fiber intake    [x]  Assess voiding habits  []  Assess bowel habits  [x]  Other: assessing relaxation strategies   Rationale: improve coordination and improve PF relaxation   to improve the patients ability to reduce pain with intercourse and improve QOL      15 min Neuromuscular Re-education:  []  See flow sheet :   []  Pelvic floor strengthening                 [x]  Pelvic floor downtraining  []  Quality pelvic floor contractions       [x]  Relaxation techniques  []  Urge suppression exercises  [x]  Other: TPR release to abdominal scar tissue with diaphragmatic breathing for overall relaxation  Rationale: reduce muscle guarding and improve PF awareness  to improve the patients ability to reduce pain for improved I with ADLs               With   [x] TE   [x] TA   [x] neuro  [] manual   [] other: Patient Education: [x] Review HEP    [] Progressed/Changed HEP based on:   [x] positioning   [x] body mechanics   [] transfers   [] heat/ice application    [] other: encouraged pt to continue with diaphragmatic breathing and encouraged pt to take notice of how she feels after today's session as well as how she feels between now and her NV     Other Objective/Functional Measures:   Began session with use of MHP to L/S and anterior pelvis    Pain Level (0-10 scale) post treatment: 2/10    ASSESSMENT/Changes in Function: Mrs. Clary Correa continues to have intermittent pain. She is challenged with compliance with her HEP due to her busy home life.  PT continue to focus on relaxation strategies and PF isolation to improve function and reduce frequency of UI.     []  Decrease # of leaks   [] No change []  Improving [] Resolved     []  Decrease hypertonus [] No change []  Improving [] Resolved     []  Increase void interval [] No change []  Improving [] Resolved     []  Increase PF strength [] No change []  Improving [] Resolved     []  Increase PF endurance [] No change []  Improving [] Resolved     []  Increase endurance [] No change []  Improving [] Resolved     []  Decrease # of pads [] No change []  Improving [] Resolved     []  Decrease pain [] No change []  Improving [] Resolved     []  Increased coordination [] No change []  Improving [] Resolved     []  Increased Bowel Frequency [] No change []  Improving [] Resolved       Patient will continue to benefit from skilled PT services to modify and progress therapeutic interventions, address functional mobility deficits, address strength deficits, analyze and address soft tissue restrictions, analyze and cue movement patterns, analyze and modify body mechanics/ergonomics and instruct in home and community integration to attain remaining goals. []  See Plan of Care  [x]  See progress note/recertification  []  See Discharge Summary         Progress towards goals / Updated goals:  Goal 1:  Patient will improve FOTO score for PFDI Pain decreased by 5-8% points indicating improvement in function and decreased pain with intercourse. Goal 2: Patient to report 5/10 pelvic pain or less with daily activities. Pain past few visits 3-5/10  09/22/2020  Goal 3: Patient to report decreased episodes of UI to three times per week or less. Goal 4: Patient will report 50% or greater overall improvement. Slowly improving and reports of decreased pain last 2 visits.  09/29/2020       PLAN  []  Upgrade activities as tolerated     [x]  Continue plan of care  []  Update interventions per flow sheet       []  Discharge due to:_  []  Other:_      Evy Davenport, PT 10/13/2020  4:38 PM    Future Appointments   Date Time Provider Naren Khan   10/20/2020 12:00 PM Crystal Flanagan [facility]   10/27/2020 12:00 PM Mayda Roland PT [id number]

## 2020-10-14 DIAGNOSIS — R21 RASH OF HANDS: ICD-10-CM

## 2020-10-14 NOTE — PROGRESS NOTES
[facility] Amisha Bassam  [address] [facility]  [phone] fax    Pelvic Floor Progress Note  Patient name: Elliot Romero Start of Care: 2020   Referral source: Anais Obrien NP : 1985   Medical/Treatment Diagnosis: Pelvic floor dysfunction [M62.89]  Payor: [address] [place] [address] Road / Plan: [address] / Product Type: Managed Care Medicaid /  Onset Date:2020     Prior Hospitalization: see medical history Provider#: [id number]   Medications: Verified on Patient Summary List    Comorbidities: n/a  Prior Level of Function: less pain, mod ind with ADLs/house chores and job duties  Visits from [facility]: 6    Missed Visits: 0    Established Goals:           Excellent Good         Limited           None  [] Increase Pelvic MM strength []  []  []  []  [] Decrease Pelvic MM hypertonus []  []  []  []  [] Decrease Incontinence Episodes []  []  []  []   [] Improve Voiding Habits  []  []  []  []  [] Decreased Urgency   []  []  []  []    Key Functional Changes: Mrs. Page Raymundo is making fair progress with PT. She has had three visits since the last progress note and reports decreased pain levels of 2-3/10 each visit. She has intermittent compliance with her home program and continued UI. She reports increased awareness of PF, but continued abdominal pain with no particular pattern. Pain is aggravated with activity. The patient has continued UI, but reports slow progress overall. Updated Goals: to be achieved in 4 weeks:  STG 1: Patient to improve FOTO score for PFDI pain decrease by 5-8% to demonstrate improved function. STG 2: Patient to report decreased episodes of UI to three times per week or less. Goal 3: Patient will report 50% or greater overall improvement with pain with daily activities. Goal 4: Patient will be I with self management of pain with strategies learned in PT for preparation for discharge. ASSESSMENT/RECOMMENDATIONS:  []Continue therapy per initial plan/protocol at a frequency of  1-2 x per week for 4 weeks  []Continue therapy with the following recommended changes:_____________________      _____________________________________________________________________  []Discontinue therapy progressing towards or have reached established goals  []Discontinue therapy due to lack of appreciable progress towards goals  []Discontinue therapy due to lack of attendance or compliance  []Await Physician's recommendations/decisions regarding therapy  []Other:________________________________________________________________    Thank you for this referral.   Kiana Dan, PT 10/14/2020 4:06 PM  NOTE TO PHYSICIAN:  [address] AND   FAX TO [facility]: [phone]  If you are unable to process this request in 24 hours please contact our office: [phone] I have read the above report and request that my patient continue as recommended. ? I have read the above report and request that my patient continue therapy with the following changes/special instructions:___________________________________________________________  ? I have read the above report and request that my patient be discharged from therapy.     [de-identified] Signature:____________Date:_________TIME:________    [employer], Date and Time must be completed for valid certification **

## 2020-10-15 ENCOUNTER — HOSPITAL ENCOUNTER (OUTPATIENT)
Dept: PHYSICAL THERAPY | Age: 35
Discharge: HOME | End: 2020-10-15
Payer: COMMERCIAL

## 2020-10-15 PROCEDURE — 97112 NEUROMUSCULAR REEDUCATION: CPT

## 2020-10-15 PROCEDURE — 97110 THERAPEUTIC EXERCISES: CPT

## 2020-10-15 PROCEDURE — 97530 THERAPEUTIC ACTIVITIES: CPT

## 2020-10-15 RX ORDER — TRIAMCINOLONE ACETONIDE 5 MG/G
CREAM TOPICAL
Qty: 15 G | Refills: 0 | Status: SHIPPED | OUTPATIENT
Start: 2020-10-15 | End: 2020-10-29 | Stop reason: SDUPTHER

## 2020-10-15 RX ORDER — KETOCONAZOLE 20 MG/G
CREAM TOPICAL
Qty: 15 G | Refills: 0 | Status: SHIPPED | OUTPATIENT
Start: 2020-10-15 | End: 2022-04-26

## 2020-10-15 NOTE — PROGRESS NOTES
PF DAILY TREATMENT NOTE 3-16    Patient Name: April Booth  Date:10/15/2020  : 1985  [x]  Patient  Verified  Payor: [address] [place] [address] Road / Plan: [employer] [employer] / Product Type: Managed Care Medicaid /    In time:442  Out time:525  Total Treatment Time (min): 43  Visit #: 4 of 6    Medicare/BCBS Only   Total Timed Codes (min):   1:1 Treatment Time:       Treatment Area: [x] Pelvic Floor     [] Other:    SUBJECTIVE  Pain Level (0-10 scale): 2-3/10  Any medication changes, allergies to medications, adverse drug reactions, diagnosis change, or new procedure performed?: [x] No    [] Yes (see summary sheet for update)  Subjective functional status/changes:   [x] No changes reported  Pt reports no significant change since last visit. No improvement with last treatment.      Pt reports average pain over the last week is 3/10    OBJECTIVE    Modality rationale: decrease pain and increase tissue extensibility to improve the patients ability to improve activity tolerance and I with ADLs   Min Type Additional Details    [] Estim:  []Unatt       []IFC  []Premod                        []Other:  []w/ice   []w/heat  Position:  Location:    [] Estim: []Att    []TENS instruct  []NMES                    []Other:  []w/US   []w/ice   []w/heat  Position:  Location:    []  Ultrasound: []Continuous   [] Pulsed                           []1MHz   []3MHz Position:  Location:   10 with TE []  Ice     [x]  heat  []  Ice massage  []  Laser   []  Anodyne Position:prone  Location: lower abdomen/pelvis   [] Skin assessment post-treatment:  []intact []redness- no adverse reaction    []redness  adverse reaction:         15 min Therapeutic Exercise:  [] See flow sheet :   []  Pelvic floor strengthening                 [x]  Pelvic floor downtraining  [x]  Quality pelvic floor contractions       [x]  Relaxation techniques  []  Urge suppression exercises  []  Other:  Rationale: improve coordination and improve PF relaxation to improve the patients ability to reduce pain with ADls and intercourse for improved QOL       15 min Therapeutic Activity:  []  See flow sheet :    []  Increase Tissue extensibility        []  Assess fiber intake    []  Assess voiding habits  []  Assess bowel habits  [x]  Other: issue and instruction with use of dilator and relaxation strategies   Rationale: improve coordination and improve PF awareness  to improve the patients ability to reduce pain with intercourse and improve QOL      13 min Neuromuscular Re-education:  []  See flow sheet :   [x]  Pelvic floor strengthening                 []  Pelvic floor downtraining  [x]  Quality pelvic floor contractions       [x]  Relaxation techniques  []  Urge suppression exercises  [x]  Other: MFR to right TA and obliques with diaphragmatic breathing  Rationale: improve coordination and improved PF awareness  to improve the patients ability to reduce pain with ADLs     With   [x] TE   [x] TA   [x] neuro  [] manual   [] other: Patient Education: [x] Review HEP    [] Progressed/Changed HEP based on:   [] positioning   [] body mechanics   [] transfers   [] heat/ice application    [] other: issue and review of updated HEP to maintain PF relaxation and reduce abdominal/pelvic discomfort. Other Objective/Functional Measures: Added exercises per flow sheet  Pain Level (0-10 scale) post treatment: 2/10    ASSESSMENT/Changes in Function: Mrs. Dev Hanley is making fair progress. Pain levels continue to be maintained at 2-3/10 this week. Pt reports she would like to use a tampon and have intercourse without pain. Instructed on use of dilator and continuing with HEP for improved PF relaxation.  Will re-assess NV.    []  Decrease # of leaks   [] No change []  Improving [] Resolved     []  Decrease hypertonus [] No change []  Improving [] Resolved     []  Increase void interval [] No change []  Improving [] Resolved     []  Increase PF strength [] No change []  Improving [] Resolved     []  Increase PF endurance [] No change []  Improving [] Resolved     []  Increase endurance [] No change []  Improving [] Resolved     []  Decrease # of pads [] No change []  Improving [] Resolved     []  Decrease pain [] No change []  Improving [] Resolved     []  Increased coordination [] No change []  Improving [] Resolved     []  Increased Bowel Frequency [] No change []  Improving [] Resolved       Patient will continue to benefit from skilled PT services to modify and progress therapeutic interventions, address functional mobility deficits, address ROM deficits, analyze and address soft tissue restrictions, analyze and cue movement patterns, analyze and modify body mechanics/ergonomics, assess and modify postural abnormalities, address imbalance/dizziness and instruct in home and community integration to attain remaining goals. []  See Plan of Care  []  See progress note/recertification  []  See Discharge Summary         Progress towards goals / Updated goals:  STG 1: Patient to improve FOTO score for PFDI pain decrease by 5-8% to demonstrate improved function. STG 2: Patient to report decreased episodes of UI to three times per week or less. Pt reports a little leakage every day and wears a panty liner. 10/15/2020  Goal 3: Patient will report 50% or greater overall improvement with pain with daily activities.    Goal 4: Patient will be I with self management of pain with strategies learned in PT for preparation for discharge.      PLAN  []  Upgrade activities as tolerated     [x]  Continue plan of care  []  Update interventions per flow sheet       []  Discharge due to:_  []  Other:_      Agnieszka Harrison, PT 10/15/2020  4:42 PM    Future Appointments   Date Time Provider Naren Khan   10/20/2020 12:00 PM Pete Win PT [id number]   10/27/2020 12:00 PM Pete Win PT [id number]

## 2020-10-20 ENCOUNTER — HOSPITAL ENCOUNTER (OUTPATIENT)
Dept: PHYSICAL THERAPY | Age: 35
Discharge: HOME | End: 2020-10-20
Payer: COMMERCIAL

## 2020-10-20 PROCEDURE — 97110 THERAPEUTIC EXERCISES: CPT

## 2020-10-20 PROCEDURE — 97530 THERAPEUTIC ACTIVITIES: CPT

## 2020-10-20 PROCEDURE — 97112 NEUROMUSCULAR REEDUCATION: CPT

## 2020-10-20 NOTE — PROGRESS NOTES
PF DAILY TREATMENT NOTE 3-16    Patient Name: Fabricio Melendez  Date:10/20/2020  : 1985  [x]  Patient  Verified  Payor: [address] [place] [address] Road / Plan: [address] / Product Type: Managed Care Medicaid /    In time:1129  Out time:1210  Total Treatment Time (min): 41  Visit #: 2 of 4    Medicare/BCBS Only   Total Timed Codes (min):   1:1 Treatment Time:       Treatment Area: [x] Pelvic Floor     [] Other:    SUBJECTIVE  Pain Level (0-10 scale): 6-7/10  Any medication changes, allergies to medications, adverse drug reactions, diagnosis change, or new procedure performed?: [x] No    [] Yes (see summary sheet for update)  Subjective functional status/changes:   [] No changes reported    Pt reports she was really \"crampy\" over the weekend. She thinks her period is starting. She reports she is still cramping today. She stats this pain is slightly different from the pelvic/abdominal pain she normally complains of. That pain is made worse with activity (walking).      OBJECTIVE    Modality rationale: decrease pain and increase tissue extensibility to improve the patients ability to improve activity tolerance and QOL   Min Type Additional Details    [] Estim:  []Unatt       []IFC  []Premod                        []Other:  []w/ice   []w/heat  Position:  Location:    [] Estim: []Att    []TENS instruct  []NMES                    []Other:  []w/US   []w/ice   []w/heat  Position:  Location:    []  Ultrasound: []Continuous   [] Pulsed                           []1MHz   []3MHz Position:  Location:   10  During TE []  Ice     [x]  heat  []  Ice massage  []  Laser   []  Anodyne Position: prone and supine  Location: L/S and pelvis   [] Skin assessment post-treatment:  []intact []redness- no adverse reaction    []redness  adverse reaction:         15 min Therapeutic Exercise:  [] See flow sheet :   []  Pelvic floor strengthening                 []  Pelvic floor downtraining  [x]  Quality pelvic floor contractions       []  Relaxation techniques  []  Urge suppression exercises  []  Other:  Rationale: increase strength and improve coordination  to improve the patients ability to improve activity tolerance and reduce pain for improved QOL       15 min Therapeutic Activity:  []  See flow sheet :    []  Increase Tissue extensibility        []  Assess fiber intake    [x]  Assess voiding habits  []  Assess bowel habits  []  Other:   Rationale: improve QOL  to improve the patients ability to void at normal intervals and reduce frequency of UI for improved QOL      11 min Neuromuscular Re-education:  []  See flow sheet :   []  Pelvic floor strengthening                 [x]  Pelvic floor downtraining  [x]  Quality pelvic floor contractions       []  Relaxation techniques  []  Urge suppression exercises  [x]  Other: SB exercises  Rationale: improve coordination  to improve the patients ability to reduce frequency of UI and improve QOL       With   [x] TE   [x] TA   [] neuro  [] manual   [] other: Patient Education: [x] Review HEP    [] Progressed/Changed HEP based on:   [] positioning   [] body mechanics   [] transfers   [] heat/ice application    [] other: review of current HEP and encouraged compliance for pain relief. Other Objective/Functional Measures: Added PPU and TA with PF    Pain Level (0-10 scale) post treatment: 3/10    ASSESSMENT/Changes in Function: Mrs. Stephanie Pavon continues to make fair progress with PT. Her pain levels are varied based on her activity level and she has not been conssitent with HEP for pain relief.      []  Decrease # of leaks   [] No change []  Improving [] Resolved     []  Decrease hypertonus [] No change []  Improving [] Resolved     []  Increase void interval [] No change []  Improving [] Resolved     []  Increase PF strength [] No change []  Improving [] Resolved     []  Increase PF endurance [] No change []  Improving [] Resolved     []  Increase endurance [] No change [] Improving [] Resolved     []  Decrease # of pads [] No change []  Improving [] Resolved     []  Decrease pain [] No change []  Improving [] Resolved     []  Increased coordination [] No change []  Improving [] Resolved     []  Increased Bowel Frequency [] No change []  Improving [] Resolved       Patient will continue to benefit from skilled PT services to modify and progress therapeutic interventions, address functional mobility deficits, address strength deficits, analyze and address soft tissue restrictions, analyze and cue movement patterns, analyze and modify body mechanics/ergonomics, assess and modify postural abnormalities and instruct in home and community integration to attain remaining goals. []  See Plan of Care  []  See progress note/recertification  []  See Discharge Summary         Progress towards goals / Updated goals:  STG 1: Patient to improve FOTO score for PFDI pain decrease by 5-8% to demonstrate improved function. STG 2: Patient to report decreased episodes of UI to three times per week or less. Pt reports a little leakage every day and wears a panty liner. 10/15/2020  Goal 3: Patient will report 50% or greater overall improvement with pain with daily activities. Progressing.  Pt reports overall improvement of  40% 10/20/2020  Goal 4: Patient will be I with self management of pain with strategies learned in PT for preparation for discharge.     PLAN  []  Upgrade activities as tolerated     [x]  Continue plan of care  []  Update interventions per flow sheet       []  Discharge due to:_  [x]  Other: biofeedback MICHAEL Nixon, PT 10/20/2020  11:29 AM    Future Appointments   Date Time Provider Naren Khan   10/27/2020 12:00 PM Anastacio Gonzales PT [id number]   11/10/2020  3:45 PM Anastacio Gonzales PT JOSE CARLOS [facility]

## 2020-10-27 ENCOUNTER — HOSPITAL ENCOUNTER (OUTPATIENT)
Dept: PHYSICAL THERAPY | Age: 35
Discharge: HOME | End: 2020-10-27
Payer: COMMERCIAL

## 2020-10-27 PROCEDURE — 97530 THERAPEUTIC ACTIVITIES: CPT

## 2020-10-27 PROCEDURE — 97110 THERAPEUTIC EXERCISES: CPT

## 2020-10-27 NOTE — PROGRESS NOTES
PF DAILY TREATMENT NOTE 3-16    Patient Name: Warden Miranda  Date:10/27/2020  : 1985  [x]  Patient  Verified  Payor: [address] [place], [place] [address] / Plan: [address] / Product Type: Managed Care Medicaid /    In time:1203  Out time:1233  Total Treatment Time (min): 30  Visit #: 3 of 4    Medicare/BCBS Only   Total Timed Codes (min):   1:1 Treatment Time:       Treatment Area: [x] Pelvic Floor     [] Other:    SUBJECTIVE  Pain Level (0-10 scale): 3-410  Any medication changes, allergies to medications, adverse drug reactions, diagnosis change, or new procedure performed?: [x] No    [] Yes (see summary sheet for update)  Subjective functional status/changes:   [] No changes reported  Pt reports some soreness today. She states intercourse has become less painful. She still feel discomfort, but it is less intense.     OBJECTIVE    Modality rationale:    Min Type Additional Details    [] Estim:  []Unatt       []IFC  []Premod                        []Other:  []w/ice   []w/heat  Position:  Location:    [] Estim: []Att    []TENS instruct  []NMES                    []Other:  []w/US   []w/ice   []w/heat  Position:  Location:    []  Ultrasound: []Continuous   [] Pulsed                           []1MHz   []3MHz Position:  Location:    []  Ice     []  heat  []  Ice massage  []  Laser   []  Anodyne Position:  Location:   [] Skin assessment post-treatment:  []intact []redness- no adverse reaction    []redness  adverse reaction:         8 min Therapeutic Exercise:  [] See flow sheet :   []  Pelvic floor strengthening                 [x]  Pelvic floor downtraining  [x]  Quality pelvic floor contractions       [x]  Relaxation techniques  []  Urge suppression exercises  []  Other:  Rationale: improve coordination and improve PF relaxation  to improve the patients ability to reduce pain with intercourse and improve QOL       8 min Therapeutic Activity:  []  See flow sheet :    []  Increase Tissue extensibility []  Assess fiber intake    []  Assess voiding habits  []  Assess bowel habits  [x]  Other: use of dilator and review of current HEP   Rationale: improve PF awareness  to improve the patients ability to to reduce pain with intercourse and improve QOL                With   [x] TE   [x] TA   [] neuro  [] manual   [] other: Patient Education: [x] Review HEP    [] Progressed/Changed HEP based on:   [x] positioning   [] body mechanics   [] transfers   [] heat/ice application    [] other: issue update and review of HEP     Other Objective/Functional Measures:   Unable to complete Biofeebdack today due to equipment malfunction  PT reviewed and issued XS dilator  Review and demo of deep squat exercise for improved PF relaxation  Review of current HEP     Pain Level (0-10 scale) post treatment: 3-4/10    ASSESSMENT/Changes in Function: Unable to assess PF relaxation progress with biofeedback today. Will try again NV.  Issued XS dilator for continued improvement with decreased pain on insertion and decreased pain with intercourse.     []  Decrease # of leaks   [] No change []  Improving [] Resolved     []  Decrease hypertonus [] No change []  Improving [] Resolved     []  Increase void interval [] No change []  Improving [] Resolved     []  Increase PF strength [] No change []  Improving [] Resolved     []  Increase PF endurance [] No change []  Improving [] Resolved     []  Increase endurance [] No change []  Improving [] Resolved     []  Decrease # of pads [] No change []  Improving [] Resolved     []  Decrease pain [] No change []  Improving [] Resolved     []  Increased coordination [] No change []  Improving [] Resolved     []  Increased Bowel Frequency [] No change []  Improving [] Resolved       Patient will continue to benefit from skilled PT services to modify and progress therapeutic interventions, address functional mobility deficits, address ROM deficits, address strength deficits, analyze and address soft tissue restrictions, analyze and cue movement patterns, analyze and modify body mechanics/ergonomics, assess and modify postural abnormalities and instruct in home and community integration to attain remaining goals. []  See Plan of Care  []  See progress note/recertification  []  See Discharge Summary         Progress towards goals / Updated goals:  STG 1: Patient to improve FOTO score for PFDI pain decrease by 5-8% to demonstrate improved function. STG 2: Patient to report decreased episodes of UI to three times per week or less. Continued UI daily. 10/27/2020  Goal 3: Patient will report 50% or greater overall improvement with pain with daily activities. Progressing. Pt reports overall improvement of  40% 10/20/2020  Goal 4: Patient will be I with self management of pain with strategies learned in PT for preparation for discharge. Progressing. Pt I with HEP, but continues to work on consistent compliance.  10/27/2020     PLAN  []  Upgrade activities as tolerated     [x]  Continue plan of care  []  Update interventions per flow sheet       []  Discharge due to:_  [x]  Other: Biofeedback MICHAEL Richardson, PT 10/27/2020  12:21 PM    Future Appointments   Date Time Provider Naren Khan   11/10/2020  3:45 PM Miya Yost, PT [facility]

## 2020-10-29 DIAGNOSIS — R21 RASH OF HANDS: ICD-10-CM

## 2020-10-29 RX ORDER — TRIAMCINOLONE ACETONIDE 5 MG/G
CREAM TOPICAL
Qty: 15 G | Refills: 0 | Status: SHIPPED
Start: 2020-10-29 | End: 2021-04-23 | Stop reason: ALTCHOICE

## 2020-10-29 NOTE — TELEPHONE ENCOUNTER
Last Visit: 9/16/20 with CHELSEA Mc  Next Appointment: pt cancelled appt 10/21/20  Previous Refill Encounter(s): 10/15/20 #15g    Requested Prescriptions     Pending Prescriptions Disp Refills    triamcinolone (ARISTOCORT) 0.5 % topical cream 15 g 0     Sig: APPLY A THIN LAYER TO THE AFFECTED AREA(S) TWO TIMES A DAY

## 2020-11-09 ENCOUNTER — HOSPITAL ENCOUNTER (OUTPATIENT)
Dept: PHYSICAL THERAPY | Age: 35
Discharge: HOME | End: 2020-11-09
Payer: COMMERCIAL

## 2020-11-09 PROCEDURE — 97112 NEUROMUSCULAR REEDUCATION: CPT

## 2020-11-09 PROCEDURE — 97110 THERAPEUTIC EXERCISES: CPT

## 2020-11-09 PROCEDURE — 97530 THERAPEUTIC ACTIVITIES: CPT

## 2020-11-09 NOTE — PROGRESS NOTES
PF DAILY TREATMENT NOTE 3-16    Patient Name: Mo Kaur  Date:2020  : 1985  [x]  Patient  Verified  Payor: [address] [place] [address] Road / Plan: [address] / Product Type: Managed Care Medicaid /    In time:1204  Out time:1255  Total Treatment Time (min): 51  Visit #: 4 of 4    Medicare/BCBS Only   Total Timed Codes (min):   1:1 Treatment Time:       Treatment Area: [x] Pelvic Floor     [] Other:    SUBJECTIVE  Pain Level (0-10 scale): 2/10  Any medication changes, allergies to medications, adverse drug reactions, diagnosis change, or new procedure performed?: [x] No    [] Yes (see summary sheet for update)  Subjective functional status/changes:   [] No changes reported  Pt reports pain with deep squat exercise. She had a few days since last visit with higher pain. As high as a 4/10. Continued pain with intercourse that continued intermittent. Pt states she is bothered the most by pelvic pain and continued to have UI. Would like to see if her strength has improved. Would like to work out again without feeling like she will damage anything. Has not been using dilator and not compliant with HEP.          OBJECTIVE    Modality rationale:    Min Type Additional Details    [] Estim:  []Unatt       []IFC  []Premod                        []Other:  []w/ice   []w/heat  Position:  Location:    [] Estim: []Att    []TENS instruct  []NMES                    []Other:  []w/US   []w/ice   []w/heat  Position:  Location:    []  Ultrasound: []Continuous   [] Pulsed                           []1MHz   []3MHz Position:  Location:    []  Ice     []  heat  []  Ice massage  []  Laser   []  Anodyne Position:  Location:   [] Skin assessment post-treatment:  []intact []redness- no adverse reaction    []redness  adverse reaction:         15 min Therapeutic Exercise:  [] See flow sheet :   [x]  Pelvic floor strengthening                 [x]  Pelvic floor downtraining  []  Quality pelvic floor contractions       [x]  Relaxation techniques  []  Urge suppression exercises  []  Other:  Rationale: increase strength, improve coordination and increased PF awareness  to improve the patients ability to reduce pelvic pain and reduce frequency with UI to improve QOL       23 min Therapeutic Activity:  []  See flow sheet :    [x]  Increase Tissue extensibility        []  Assess fiber intake    []  Assess voiding habits  []  Assess bowel habits  [x]  Other: assess compliance with HEP and use of dilator, FOTO survey   Rationale: improve PF awareness  to improve the patients ability to reduce pain with ADLs and improve QOL      13 min Neuromuscular Re-education:  []  See flow sheet :   []  Pelvic floor strengthening                 []  Pelvic floor downtraining  [x]  Quality pelvic floor contractions       []  Relaxation techniques  []  Urge suppression exercises  []  Other:  Rationale: increase strength and improve coordination  to improve the patients ability to reduce episodes of UI for improved QOL      With   [x] TE   [x] TA   [] neuro  [] manual   [] other: Patient Education: [x] Review HEP    [] Progressed/Changed HEP based on:   [] positioning   [] body mechanics   [] transfers   [] heat/ice application    [] other: reviewed importance of compliance with HEP for progress; Other Objective/Functional Measures: Added PF isolation exercises per flow sheet to work towards improved PF strength   pt reports confidence when engaging PF      Pain Level (0-10 scale) post treatment: 2/10    ASSESSMENT/Changes in Function: Mrs. Violet Hinds has made fair progress with PT. She continues to report abdominal pain and states that she has intermittent pain with intercourse. She is not compliant with her HEP or the consistent use of the dilator.  Pt to continue for 3-4 visits to increase I with HEP and establish and I maintenance program.     []  Decrease # of leaks   [] No change []  Improving [] Resolved     []  Decrease hypertonus [] No change []  Improving [] Resolved     []  Increase void interval [] No change []  Improving [] Resolved     []  Increase PF strength [] No change []  Improving [] Resolved     []  Increase PF endurance [] No change []  Improving [] Resolved     []  Increase endurance [] No change []  Improving [] Resolved     []  Decrease # of pads [] No change []  Improving [] Resolved     []  Decrease pain [] No change [x]  Improving [] Resolved     []  Increased coordination [] No change []  Improving [] Resolved     []  Increased Bowel Frequency [] No change []  Improving [] Resolved       Patient will continue to benefit from skilled PT services to modify and progress therapeutic interventions, address functional mobility deficits, address strength deficits, analyze and address soft tissue restrictions, analyze and cue movement patterns, analyze and modify body mechanics/ergonomics, assess and modify postural abnormalities and instruct in home and community integration to attain remaining goals. []  See Plan of Care  [x]  See progress note/recertification  []  See Discharge Summary         Progress towards goals / Updated goals:  STG 1: Patient to improve FOTO score for PFDI pain decrease by 5-8% to demonstrate improved function. FOTO score improved by 4 points for PFDI pain. 11/09/2020  STG 2: Patient to report decreased episodes of UI to three times per week or less. Continued UI daily. 10/27/2020  Goal 3: Patient will report 50% or greater overall improvement with pain with daily activities. Progressing. Pt reports overall improvement of  40% 10/20/2020  Goal 4: Patient will be I with self management of pain with strategies learned in PT for preparation for discharge. Progressing. Pt I with HEP, but continues to work on consistent compliance.  10/27/2020        PLAN  []  Upgrade activities as tolerated     [x]  Continue plan of care  []  Update interventions per flow sheet       []  Discharge due to:_  [x] Other: Biofeedback to assess progress MICHAEL Velazquez, PT 11/9/2020  12:04 PM    No future appointments.

## 2020-11-09 NOTE — PROGRESS NOTES
[facility]  [address] [facility]  [phone] fax    Pelvic Floor Progress Note  Patient name: Jeni Aguilar Start of Care: 2020   Referral source: Vinicius High NP : 1985   Medical/Treatment Diagnosis: Pelvic floor dysfunction [M62.89]  Payor: [address] [place] [address] Road / Plan: [employer] [address] / Product Type: Managed Care Medicaid /  Onset Date:2020     Prior Hospitalization: see medical history Provider#: [id number]   Medications: Verified on Patient Summary List    Comorbidities: n/a  Prior Level of Function:less pain, mod ind with ADLs/house chores and job duties   Visits from [facility] of Care: 10    Missed Visits: 0    Established Goals:           Excellent Good         Limited           None  [] Increase Pelvic MM strength []  []  []  []  [] Decrease Pelvic MM hypertonus []  []  [x]  []  [] Decrease Incontinence Episodes []  []  []  []   [] Improve Voiding Habits  []  []  [x]  []  [] Decreased Urgency   []  []  []  []    Key Functional Changes: Mrs. Artie Chu is making fair progress with PT. She reports decreased pain levels, but continued intermittent pain with intercourse and abdominal pain with ADLs. FOTO score improved by 4 points for PFI pain. Patient has not been compliant with HEP and use of dilator. PT to continue for 3-4 additional visits to prepare for DC and I maintenance of sxs. Updated Goals: to be achieved in 4 weeks:  Goal 1: Patient to score 10 or less with FOTO score for PFDI pain to indicate improved function. Goal 2: Patient to report 50% or greater overall improvement with pain with ADLs, for return to PLOF. Goal 3: Patient will be I with self management of pain with strategies learned in PT for preparation for discharge.     ASSESSMENT/RECOMMENDATIONS:  [x]Continue therapy per initial plan/protocol at a frequency of  1-4 x per week for 4 weeks  []Continue therapy with the following recommended changes:_____________________      _____________________________________________________________________  []Discontinue therapy progressing towards or have reached established goals  []Discontinue therapy due to lack of appreciable progress towards goals  []Discontinue therapy due to lack of attendance or compliance  []Await Physician's recommendations/decisions regarding therapy  []Other:________________________________________________________________    Thank you for this referral.   Mere Velazquez, PT 11/9/2020 3:39 PM  NOTE TO PHYSICIAN:  [address] TO [facility] Physical Therapy: [phone]  If you are unable to process this request in 24 hours please contact our office: [phone]    ? I have read the above report and request that my patient continue as recommended. ? I have read the above report and request that my patient continue therapy with the following changes/special instructions:___________________________________________________________  ? I have read the above report and request that my patient be discharged from therapy.     [de-identified] Signature:____________Date:_________TIME:________    [employer], Date and Time must be completed for valid certification **

## 2020-11-16 ENCOUNTER — HOSPITAL ENCOUNTER (OUTPATIENT)
Dept: PHYSICAL THERAPY | Age: 35
Discharge: HOME | End: 2020-11-16
Payer: COMMERCIAL

## 2020-11-16 PROCEDURE — 97530 THERAPEUTIC ACTIVITIES: CPT

## 2020-11-16 NOTE — PROGRESS NOTES
PF DAILY TREATMENT NOTE 3-16    Patient Name: Mo Kaur  Date:2020  : 1985  [x]  Patient  Verified  Payor: [address] [place], [place] [address] / Plan: [address] / Product Type: Managed Care Medicaid /    In time: 11:24  Out time:11:57  Total Treatment Time (min): 33  Visit #: 1 of 4    Treatment Area: [x] Pelvic Floor     [] Other:    SUBJECTIVE  Pain Level (0-10 scale): 3/10  Any medication changes, allergies to medications, adverse drug reactions, diagnosis change, or new procedure performed?: [x] No    [] Yes (see summary sheet for update)  Subjective functional status/changes:   [] No changes reported  Pt reports cont to experience fluctuating pain. She couldn't perform HEP as much as she wants    OBJECTIVE     33 min Therapeutic Activity:  []  See flow sheet :    []  Increase Tissue extensibility        []  Assess fiber intake    [x]  Assess voiding habits  [x]  Assess bowel habits  [x]  Other: focus on self management Of pain with education on therawand and TENs unit use  Rationale: increase ROM, increase strength, improve coordination, improve balance and increase proprioception  to improve the patients ability to improve QOL          With   [] TE   [] TA   [] neuro  [] manual   [] other: Patient Education: [x] Review HEP    [] Progressed/Changed HEP based on:   [] positioning   [] body mechanics   [] transfers   [] heat/ice application    [] other:      Other Objective/Functional Measures:   []baseline resting tone:   []slow twitch mms   []fast twitch mms   Unable to perform Biofeedback due to technological problem    Pain Level (0-10 scale) post treatment: 3/10    ASSESSMENT/Changes in Function: pt demonstrated good understanding of HEP and new information on self management with therawand and TENs unit. Will perform Biofeedback next visit and trial of ESTIM as tolerated.      []  Decrease # of leaks   [] No change []  Improving [] Resolved     []  Decrease hypertonus [] No change []  Improving [] Resolved     []  Increase void interval [] No change []  Improving [] Resolved     []  Increase PF strength [] No change []  Improving [] Resolved     []  Increase PF endurance [] No change []  Improving [] Resolved     []  Increase endurance [] No change []  Improving [] Resolved     []  Decrease # of pads [] No change []  Improving [] Resolved     []  Decrease pain [] No change []  Improving [] Resolved     []  Increased coordination [] No change []  Improving [] Resolved     []  Increased Bowel Frequency [] No change []  Improving [] Resolved       Patient will continue to benefit from skilled PT services to modify and progress therapeutic interventions, address functional mobility deficits, address ROM deficits, address strength deficits, analyze and address soft tissue restrictions, analyze and cue movement patterns, analyze and modify body mechanics/ergonomics, assess and modify postural abnormalities and instruct in home and community integration to attain remaining goals. []  See Plan of Care  [x]  See progress note/recertification  []  See Discharge Summary         Progress towards goals / Updated goals:  Updated Goals: to be achieved in 4 weeks:  Goal 1: Patient to score 10 or less with FOTO score for PFDI pain to indicate improved function. Goal 2: Patient to report 50% or greater overall improvement with pain with ADLs, for return to PLOF. Current: reported more good days than pain days 11-    Goal 3: Patient will be I with self management of pain with strategies learned in PT for preparation for discharge.   Current: progressing, given information for [employer] and TENs unit 11-    PLAN  [x]  Upgrade activities as tolerated     [x]  Continue plan of care  []  Update interventions per flow sheet       []  Discharge due to:_  []  Other:_      Paty Cisse, PT 11/16/2020  9:35 AM    Future Appointments   Date Time Provider Naren Khan   11/16/2020 11:15 AM Mary Fajardo [facility]   11/23/2020 12:45 PM Mary Fajardo [facility]   11/30/2020 12:45 PM Mary Fajardo [facility]   12/7/2020 12:45 PM Mary Fajardo [facility]

## 2020-11-23 ENCOUNTER — HOSPITAL ENCOUNTER (OUTPATIENT)
Dept: PHYSICAL THERAPY | Age: 35
Discharge: HOME | End: 2020-11-23
Payer: COMMERCIAL

## 2020-11-23 PROCEDURE — 90901 BIOFEEDBACK TRAIN ANY METH: CPT

## 2020-11-23 PROCEDURE — 97530 THERAPEUTIC ACTIVITIES: CPT

## 2020-11-30 ENCOUNTER — HOSPITAL ENCOUNTER (OUTPATIENT)
Dept: PHYSICAL THERAPY | Age: 35
Discharge: HOME | End: 2020-11-30
Payer: COMMERCIAL

## 2020-11-30 PROCEDURE — 97014 ELECTRIC STIMULATION THERAPY: CPT

## 2020-11-30 PROCEDURE — 97530 THERAPEUTIC ACTIVITIES: CPT

## 2020-11-30 NOTE — PROGRESS NOTES
PF DAILY TREATMENT NOTE 3-16    Patient Name: Jennifer Gonzales  Date:2020  : 1985  [x]  Patient  Verified  Payor: [address] [place] [address] Road / Plan: [address] / Product Type: Managed Care Medicaid /    In time: 12:51  Out time: 1:50  Total Treatment Time (min): 61  Visit #: 3 of 4    Treatment Area: [x] Pelvic Floor     [] Other:    SUBJECTIVE  Pain Level (0-10 scale): 1/10  Any medication changes, allergies to medications, adverse drug reactions, diagnosis change, or new procedure performed?: [x] No    [] Yes (see summary sheet for update)  Subjective functional status/changes:   [] No changes reported  Pt reports having light stomach problem with Thanksgiving food.  She cont to experience leakage at the end of voiding    OBJECTIVE    Modality rationale: decrease pain to improve the patients ability to tolerate ADLs   Min Type Additional Details   10 [x] Estim:  []Unatt       [x]IFC  []Premod                        []Other:  []w/ice   []w/heat  Position: supine  Location: Pelvic floor area    [] Estim: []Att    []TENS instruct  []NMES                    []Other:  []w/US   []w/ice   []w/heat  Position:  Location:    []  Ultrasound: []Continuous   [] Pulsed                           []1MHz   []3MHz Position:  Location:    []  Ice     []  heat  []  Ice massage  []  Laser   []  Anodyne Position:  Location:   [x] Skin assessment post-treatment:  [x]intact []redness- no adverse reaction    []redness  adverse reaction:       49 min Therapeutic Activity:  [x]  See flow sheet :    []  Increase Tissue extensibility        []  Assess fiber intake    []  Assess voiding habits  []  Assess bowel habits  [x]  Other: therawand and TENs unit use  Rationale: increase ROM, increase strength, improve coordination, improve balance and increase proprioception  to improve the patients ability to perform ADLs with more ease; improve quality of sexual relations          With   [] TE   [] TA   [] neuro  [] manual   [] other: Patient Education: [x] Review HEP    [] Progressed/Changed HEP based on:   [] positioning   [] body mechanics   [] transfers   [] heat/ice application    [] other:      Other Objective/Functional Measures:   []baseline resting tone:   []slow twitch mms   []fast twitch mms   More pain reported on Right side today    Pain Level (0-10 scale) post treatment: 0/10    ASSESSMENT/Changes in Function: Pt demonstrates good understanding with all self management. She reports feeling more confident now with dealing with pain on her own. Min relaxation/improve tone of PF muscles after therawand use. Will review self management, strengthening therex for PF muscles and core next visit. []  Decrease # of leaks   [] No change []  Improving [] Resolved     []  Decrease hypertonus [] No change []  Improving [] Resolved     []  Increase void interval [] No change []  Improving [] Resolved     []  Increase PF strength [] No change []  Improving [] Resolved     []  Increase PF endurance [] No change []  Improving [] Resolved     []  Increase endurance [] No change []  Improving [] Resolved     []  Decrease # of pads [] No change []  Improving [] Resolved     []  Decrease pain [] No change []  Improving [] Resolved     []  Increased coordination [] No change []  Improving [] Resolved     []  Increased Bowel Frequency [] No change []  Improving [] Resolved       Patient will continue to benefit from skilled PT services to modify and progress therapeutic interventions, address functional mobility deficits, address ROM deficits, address strength deficits, analyze and address soft tissue restrictions, analyze and cue movement patterns, analyze and modify body mechanics/ergonomics, assess and modify postural abnormalities and instruct in home and community integration to attain remaining goals.      []  See Plan of Care  [x]  See progress note/recertification  []  See Discharge Summary         Progress towards goals / Updated goals:  Updated Goals: to be achieved in 4 weeks:  Goal 1: Patient to score 10 or less with FOTO score for PFDI pain to indicate improved function.       Goal 2: Patient to report 50% or greater overall improvement with pain with ADLs, for return to PLOF.   Current: reported more good days than pain days 11-     Goal 3: Patient will be I with self management of pain with strategies learned in PT for preparation for discharge.   Current: progressing, given information for Therawand and TENs unit 11-       PLAN  [x]  Upgrade activities as tolerated     [x]  Continue plan of care  []  Update interventions per flow sheet       []  Discharge due to:_  []  Other:_      Hannah Rivera, PT 11/30/2020  9:26 AM    Future Appointments   Date Time Provider Naren Khan   11/30/2020 12:45 PM Shea Zamora [id number] [facility]   12/4/2020  4:30 PM Shea Zamora [id number] [facility]   12/7/2020 12:45 PM Shea Zamora [id number] [facility]   12/11/2020  4:30 PM Shea Zamora [id number] [facility]

## 2020-12-04 ENCOUNTER — HOSPITAL ENCOUNTER (OUTPATIENT)
Dept: PHYSICAL THERAPY | Age: 35
Discharge: HOME | End: 2020-12-04
Payer: COMMERCIAL

## 2020-12-04 PROCEDURE — 97112 NEUROMUSCULAR REEDUCATION: CPT

## 2020-12-04 PROCEDURE — 97530 THERAPEUTIC ACTIVITIES: CPT

## 2020-12-04 NOTE — PROGRESS NOTES
PF DAILY TREATMENT NOTE 3-16    Patient Name: Darinel Salazar  Date:2020  : 1985  [x]  Patient  Verified  Payor: [address] [place], [place] [address] / Plan: [address] / Product Type: Managed Care Medicaid /    In time: 1:37  Out time:2:13  Total Treatment Time (min): 36  Visit #: 4 of 4    Treatment Area: [x] Pelvic Floor     [] Other:    SUBJECTIVE  Pain Level (0-10): 1/10  Any medication changes, allergies to medications, adverse drug reactions, diagnosis change, or new procedure performed?: [x] No    [] Yes (see summary sheet for update)  Subjective functional status/changes:   [] No changes reported  Pt reports being sore due to active cycle     OBJECTIVE    28 min Therapeutic Activity:  [x]  See flow sheet :    []  Increase Tissue extensibility        []  Assess fiber intake    [x]  Assess voiding habits  [x]  Assess bowel habits  [x]  Other: reviewed TENs unit, pelviwand use, brace for D.R, updated HEP reviewed   Rationale: increase ROM, increase strength, improve coordination, improve balance and increase proprioception  to improve the patients ability to perform aDLs safely and comfortably      8 min Neuromuscular Re-education:  []  See flow sheet :   [x]  Pelvic floor strengthening                 []  Pelvic floor downtraining  []  Quality pelvic floor contractions       []  Relaxation techniques  []  Urge suppression exercises  [x]  Other: core strengthening therex, lifting mechanics  Rationale: increase ROM, increase strength, improve coordination, improve balance and increase proprioception  to improve the patients ability to perform aDLs/house chores with ease and safety    With   [] TE   [] TA   [] neuro  [] manual   [] other: Patient Education: [x] Review HEP    [] Progressed/Changed HEP based on:   [] positioning   [] body mechanics   [] transfers   [] heat/ice application    [] other:      Other Objective/Functional Measures:   []baseline resting tone:   []slow twitch mms []fast twitch mms   4 finger width diastasis recti at umbilicus; 3 finger width 2 cm sup to umbilicus   Valsalva maneuver improved with core engagement    Pain Level (0-10 scale) post treatment: 1/10    ASSESSMENT/Changes in Function: Pt wasn't able to attempt pelviwand at home due to menstrual cycle. She present with diastasis recti with significant gap. Demonstrates good understanding of updated HEP for core strengthening. Will cont to review core strengthening and relaxation for PF muscles next visit. []  Decrease # of leaks   [] No change []  Improving [] Resolved     []  Decrease hypertonus [] No change []  Improving [] Resolved     []  Increase void interval [] No change []  Improving [] Resolved     []  Increase PF strength [] No change []  Improving [] Resolved     []  Increase PF endurance [] No change []  Improving [] Resolved     []  Increase endurance [] No change []  Improving [] Resolved     []  Decrease # of pads [] No change []  Improving [] Resolved     []  Decrease pain [] No change []  Improving [] Resolved     []  Increased coordination [] No change []  Improving [] Resolved     []  Increased Bowel Frequency [] No change []  Improving [] Resolved       Patient will continue to benefit from skilled PT services to modify and progress therapeutic interventions, address functional mobility deficits, address ROM deficits, address strength deficits, analyze and address soft tissue restrictions, analyze and cue movement patterns, analyze and modify body mechanics/ergonomics, assess and modify postural abnormalities and instruct in home and community integration to attain remaining goals.      []  See Plan of Care  [x]  See progress note/recertification  []  See Discharge Summary         Progress towards goals / Updated goals:  Updated Goals: to be achieved in 4 weeks:  Goal 1: Patient to score 10 or less with FOTO score for PFDI pain to indicate improved function.       Goal 2: Patient to report 50% or greater overall improvement with pain with ADLs, for return to PLOF.   Current: reported more good days than pain days 11-     Goal 3: Patient will be I with self management of pain with strategies learned in PT for preparation for discharge.   Current: progressing, given information for Therawand and TENs unit 11-    PLAN  [x]  Upgrade activities as tolerated     [x]  Continue plan of care  []  Update interventions per flow sheet       []  Discharge due to:_  []  Other:_      Jess Rodriguez, PT 12/4/2020  8:52 AM    Future Appointments   Date Time Provider Naren Khan   12/4/2020  1:30 PM Erven See [id number] [facility]   12/7/2020 12:45 PM Quentin PALACIOS [id number] [facility]   12/11/2020 10:30 AM Erven See [id number] [facility]

## 2020-12-07 ENCOUNTER — HOSPITAL ENCOUNTER (OUTPATIENT)
Dept: PHYSICAL THERAPY | Age: 35
Discharge: HOME | End: 2020-12-07
Payer: COMMERCIAL

## 2020-12-07 PROCEDURE — 97110 THERAPEUTIC EXERCISES: CPT

## 2020-12-07 PROCEDURE — 97112 NEUROMUSCULAR REEDUCATION: CPT

## 2020-12-07 PROCEDURE — 97530 THERAPEUTIC ACTIVITIES: CPT

## 2020-12-07 NOTE — PROGRESS NOTES
PF DAILY TREATMENT NOTE 3-16    Patient Name: Nilson Swenson  Date:2020  : 1985  [x]  Patient  Verified  Payor: [address] [place] [address] Road / Plan: [address] / Product Type: Managed Care Medicaid /    In time: 12:49  Out time:1:33  Total Treatment Time (min): 44  Visit #: 1 of 4    Treatment Area: [x] Pelvic Floor     [] Other:    SUBJECTIVE  Pain Level (0-10 scale): 210  Any medication changes, allergies to medications, adverse drug reactions, diagnosis change, or new procedure performed?: [x] No    [] Yes (see summary sheet for update)  Subjective functional status/changes:   [] No changes reported  Pt reports being sore during the last several days and couldn't try her pelviwand because of her period    OBJECTIVE    20 min Therapeutic Exercise:  [x] See flow sheet :   [x]  Pelvic floor strengthening                 []  Pelvic floor downtraining  []  Quality pelvic floor contractions       [x]  Relaxation techniques  []  Urge suppression exercises  [x]  Other:core strengthening  Rationale: increase ROM and increase strength  to improve the patients ability to perform ADLs with more ease       14 min Therapeutic Activity:  []  See flow sheet :    []  Increase Tissue extensibility        []  Assess fiber intake    [x]  Assess voiding habits  [x]  Assess bowel habits  [x]  Other: posture and self management for pain   Rationale: increase ROM, increase strength, improve coordination, improve balance and increase proprioception  to improve the patients ability to perform ADLs and improve sexual quality      10 min Neuromuscular Re-education:  [x]  See flow sheet :   []  Pelvic floor strengthening                 []  Pelvic floor downtraining  []  Quality pelvic floor contractions       []  Relaxation techniques  []  Urge suppression exercises  []  Other:  Rationale: increase ROM, increase strength, improve coordination, improve balance and increase proprioception  to improve the patients ability to perform ADLs, improve urgency      With   [] TE   [] TA   [] neuro  [] manual   [] other: Patient Education: [x] Review HEP    [] Progressed/Changed HEP based on:   [] positioning   [] body mechanics   [] transfers   [] heat/ice application    [] other:      Other Objective/Functional Measures:   []baseline resting tone:   []slow twitch mms   []fast twitch mms   Mod challenged with maintaining breathing pattern with PF muscles contraction      Pain Level (0-10 scale) post treatment: 2/10    ASSESSMENT/Changes in Function: Pt making slow progress, demonstrates good understanding but fair compliance with HEP and self management techniques, mainly due to busy schedule/housecores. Will review HEP and answer all questions at last visit to promote independence with self management. []  Decrease # of leaks   [] No change []  Improving [] Resolved     []  Decrease hypertonus [] No change []  Improving [] Resolved     []  Increase void interval [] No change []  Improving [] Resolved     []  Increase PF strength [] No change []  Improving [] Resolved     []  Increase PF endurance [] No change []  Improving [] Resolved     []  Increase endurance [] No change []  Improving [] Resolved     []  Decrease # of pads [] No change []  Improving [] Resolved     []  Decrease pain [] No change []  Improving [] Resolved     []  Increased coordination [] No change []  Improving [] Resolved     []  Increased Bowel Frequency [] No change []  Improving [] Resolved       Patient will continue to benefit from skilled PT services to modify and progress therapeutic interventions, address functional mobility deficits, address ROM deficits, address strength deficits, analyze and address soft tissue restrictions, analyze and cue movement patterns, analyze and modify body mechanics/ergonomics, assess and modify postural abnormalities and instruct in home and community integration to attain remaining goals.      []  See Plan of Care  [x]  See progress note/recertification  []  See Discharge Summary         Progress towards goals / Updated goals:  Updated Goals: to be achieved in 4 weeks:  Goal 1: Patient to score 10 or less with FOTO score for PFDI pain to indicate improved function.    Current: no change compared to last assessment 12-7-2020     Goal 2: Patient to report 50% or greater overall improvement with pain with ADLs, for return to PLOF.   Current: reported more good days than pain days 11-; progressing slowly 12-7-2002      Goal 3: Patient will be I with self management of pain with strategies learned in PT for preparation for discharge.   Current: progressing, given information for Therawand and TENs unit 11-; progressing well, good understanding 12-7-2020    PLAN  [x]  Upgrade activities as tolerated     [x]  Continue plan of care  []  Update interventions per flow sheet       []  Discharge due to:_  []  Other:_      Liseth Scott, PT 12/7/2020  9:54 AM    Future Appointments   Date Time Provider Naren Khan   12/7/2020 12:45 PM Foster Collado [id number] [facility]   12/11/2020 10:30 AM Dawson Hdez [id number] [facility]

## 2020-12-11 ENCOUNTER — HOSPITAL ENCOUNTER (OUTPATIENT)
Dept: PHYSICAL THERAPY | Age: 35
Discharge: HOME | End: 2020-12-11
Payer: COMMERCIAL

## 2020-12-11 PROCEDURE — 97530 THERAPEUTIC ACTIVITIES: CPT

## 2020-12-11 NOTE — PROGRESS NOTES
[facility] Yemi Bowles  [address] [facility]  [phone] fax    Physical Therapy Discharge Summary    Patient name: Vianey Mcfarland Start of Care: 2020   Referral source: Irena Barrett NP : 1985   Medical/Treatment Diagnosis: Pelvic floor dysfunction [M62.89]  Payor: [address] [place] [address] Road / Plan: [address] / Product Type: Managed Care Medicaid /  Onset UZCD:      Prior Hospitalization: see medical history Provider#: [id number]   Medications: Verified on Patient Summary List     Comorbidities: n/a  Prior Level of Function:less pain, mod ind with ADLs/house chores and job duties     Visits from [facility] of Care: 15    Missed Visits: 0    Reporting Period : 2020 to 2020    Summary of Care:  Updated Goals: to be achieved in 4 weeks:  Goal 1: Patient to score 10 or less with FOTO score for PFDI pain to indicate improved function.    Current: no change compared to last assessment 2020  Status at discharge: not met     Goal 2: Patient to report 50% or greater overall improvement with pain with ADLs, for return to PLOF.   Current: reported more good days than pain days 2020; progressing slowly 2002   Status at discharge: not met    Goal 3: Patient will be I with self management of pain with strategies learned in PT for preparation for discharge. Current: progressing, given information for Therawand and TENs unit 2020; progressing well, good understanding 2020; met 2020  Status at discharge: met      ASSESSMENT/RECOMMENDATIONS: pt making slow progress. She demonstrates fair compliance with HEP and elevated stress with her children's schedule. Pt reports min improvement of pain, improving ease with voiding and independence with HEP/self management. Discharged at this time as skilled PF PT is no longer medically necessary.      [x]Discontinue therapy: [x]Patient has reached or is progressing toward set goals      []Patient is non-compliant or has abdicated      []Due to lack of appreciable progress towards set goals    Kathyleen Leyden, PT 12/11/2020 1:29 PM

## 2020-12-11 NOTE — PROGRESS NOTES
PF DAILY TREATMENT NOTE 3-16    Patient Name: Genell Hodgkins  Date:2020  : 1985  [x]  Patient  Verified  [facility] [address] / Plan: [address] / Product Type: Managed Care Medicaid /    In time:10:33  Out time:11:15  Total Treatment Time (min): 42  Visit #: 2 of 4    Treatment Area: [x] Pelvic Floor     [] Other:    SUBJECTIVE  Pain Level (0-10 scale): 3/10  Any medication changes, allergies to medications, adverse drug reactions, diagnosis change, or new procedure performed?: [x] No    [] Yes (see summary sheet for update)  Subjective functional status/changes:   [] No changes reported  Pt reports some stomachache during the last 2 days.      OBJECTIVE    42 min Therapeutic Activity:  []  See flow sheet :    []  Increase Tissue extensibility        []  Assess fiber intake    [x]  Assess voiding habits  [x]  Assess bowel habits  [x]  Other: reviewed HEP, finding balance, stress management and pelvic floor function   Rationale: increase ROM, increase strength, improve coordination, improve balance and increase proprioception  to improve the patients ability to perform ADLs and improve defecation/urination dynamics          With   [] TE   [] TA   [] neuro  [] manual   [] other: Patient Education: [x] Review HEP    [] Progressed/Changed HEP based on:   [] positioning   [] body mechanics   [] transfers   [] heat/ice application    [] other:      Other Objective/Functional Measures:   []baseline resting tone:   []slow twitch mms   []fast twitch mms    Pain Level (0-10 scale) post treatment: 3/10    ASSESSMENT/Changes in Function: see Discharge summary please    []  Decrease # of leaks   [] No change []  Improving [] Resolved     []  Decrease hypertonus [] No change []  Improving [] Resolved     []  Increase void interval [] No change []  Improving [] Resolved     []  Increase PF strength [] No change []  Improving [] Resolved     []  Increase PF endurance [] No change [] Improving [] Resolved     []  Increase endurance [] No change []  Improving [] Resolved     []  Decrease # of pads [] No change []  Improving [] Resolved     []  Decrease pain [] No change []  Improving [] Resolved     []  Increased coordination [] No change []  Improving [] Resolved     []  Increased Bowel Frequency [] No change []  Improving [] Resolved        See Plan of Care  []  See progress note/recertification  [x]  See Discharge Summary         Progress towards goals / Updated goals:  Updated Goals: to be achieved in 4 weeks:  Goal 1: Patient to score 10 or less with FOTO score for PFDI pain to indicate improved function.    Current: no change compared to last assessment 12-7-2020     Goal 2: Patient to report 50% or greater overall improvement with pain with ADLs, for return to PLOF.   Current: reported more good days than pain days 11-; progressing slowly 12-7-2002      Goal 3: Patient will be I with self management of pain with strategies learned in PT for preparation for discharge.   Current: progressing, given information for Therawand and TENs unit 11-; progressing well, good understanding 12-7-2020; met 12-      PLAN  []  Upgrade activities as tolerated     []  Continue plan of care  []  Update interventions per flow sheet       [x]  Discharge due to:_ independent with self management  []  Other:_      Hannah Rivera, PT 12/11/2020  8:49 AM    Future Appointments   Date Time Provider Naren Khan   12/11/2020 10:30 AM Shea Zamora [id number] [facility]

## 2021-03-24 ENCOUNTER — VIRTUAL VISIT (OUTPATIENT)
Dept: FAMILY MEDICINE CLINIC | Age: 36
End: 2021-03-24
Payer: COMMERCIAL

## 2021-03-24 DIAGNOSIS — M77.8 RIGHT WRIST TENDONITIS: Primary | ICD-10-CM

## 2021-03-24 DIAGNOSIS — R06.83 SNORING: ICD-10-CM

## 2021-03-24 DIAGNOSIS — Z01.82 ENCOUNTER FOR ALLERGY TESTING: ICD-10-CM

## 2021-03-24 PROCEDURE — 99212 OFFICE O/P EST SF 10 MIN: CPT | Performed by: NURSE PRACTITIONER

## 2021-03-24 NOTE — PROGRESS NOTES
Blanca Hirsch is a 39 y.o. female who was seen by synchronous (real-time) audio-video technology on 3/24/2021 for No chief complaint on file. Assessment & Plan:   Diagnoses and all orders for this visit:    1. Right wrist tendonitis  -     REFERRAL TO HAND SURGERY    2. Encounter for allergy testing  -     REFERRAL TO ALLERGY    3. Snoring  -     REFERRAL TO NEUROLOGY      Follow-up and Dispositions    · Return if symptoms worsen or fail to improve, for keep scheduled appt. I spent at least 20 minutes on this visit with this established patient. 712  Subjective:   Patient has been experiencing right wrist for the last year. Patient was in PT for right wrist tendonitis. Comments after PT was complete she could feel a cracking/popping feeling to medial aspect of wrist.  Comments she over the past month she been having to wear wrist brace day/night. Prior she would wear brace mostly at night. Patient has not been performing exercises learned in PT. Comments she has not been able to eat gluten since she has her last daughter 2/12 years ago. Comments she stopped eating gluten when she was 3 months. Comments she noticed when she would eat foods with gluten she would develop itching and rash and also diarrhea. Patient also states in Oct. 2020 she had eaten crab and afterwards noticed lip swelling and throat was itching and also felt tingling all over her face. Comments she drank a lot of water and began to feel better after several hours. States she wanted to wait to see if it got better, she does have epi pens at home for her children whom have food allergies. Patient comments she is tired a lot and also snores. Reports she has had her thyroid checked approx 5 years ago which was bro. Would to be evaluated for sleep apnea. Prior to Admission medications    Medication Sig Start Date End Date Taking?  Authorizing Provider   triamcinolone (ARISTOCORT) 0.5 % topical cream APPLY A THIN LAYER TO THE AFFECTED AREA(S) TWO TIMES A DAY 10/29/20   Mary Ball NP   ketoconazole (NIZORAL) 2 % topical cream APPLY TO AFFECTED AREA(S) DAILY FOR 7 DAYS 10/15/20   aMry Ball NP   mv-mn/folic ac/calcium/vit K1 (WOMEN'S 50 PLUS DAILY FORMULA PO) Take  by mouth. Provider, Historical     There is no problem list on file for this patient. There are no active problems to display for this patient. Current Outpatient Medications   Medication Sig Dispense Refill    triamcinolone (ARISTOCORT) 0.5 % topical cream APPLY A THIN LAYER TO THE AFFECTED AREA(S) TWO TIMES A DAY 15 g 0    ketoconazole (NIZORAL) 2 % topical cream APPLY TO AFFECTED AREA(S) DAILY FOR 7 DAYS 15 g 0    mv-mn/folic ac/calcium/vit K1 (WOMEN'S 50 PLUS DAILY FORMULA PO) Take  by mouth. Allergies   Allergen Reactions    Cephalexin Hives    Gluten Diarrhea and Itching     Past Medical History:   Diagnosis Date    History of pre-eclampsia     Pelvic pain      Past Surgical History:   Procedure Laterality Date    HX APPENDECTOMY  2013    HX WISDOM TEETH EXTRACTION  2004     Family History   Problem Relation Age of Onset    No Known Problems Mother     No Known Problems Father     Hypertension Sister     Anxiety Sister     OCD Sister     No Known Problems Brother      Social History     Tobacco Use    Smoking status: Never Smoker    Smokeless tobacco: Never Used   Substance Use Topics    Alcohol use: Not Currently       Review of Systems   Musculoskeletal: Positive for joint pain (right wrist pain). Objective:   No flowsheet data found.    General: alert, cooperative, no distress   Mental  status: normal mood, behavior, speech, dress, motor activity, and thought processes, able to follow commands   HENT: NCAT   Neck: no visualized mass   Resp: no respiratory distress   Neuro: no gross deficits   Skin: no discoloration or lesions of concern on visible areas   Psychiatric: normal affect, consistent with stated mood, no evidence of hallucinations     Additional exam findings: We discussed the expected course, resolution and complications of the diagnosis(es) in detail. Medication risks, benefits, costs, interactions, and alternatives were discussed as indicated. I advised her to contact the office if her condition worsens, changes or fails to improve as anticipated. She expressed understanding with the diagnosis(es) and plan. Racquel Freeman, was evaluated through a synchronous (real-time) audio-video encounter. The patient (or guardian if applicable) is aware that this is a billable service. Verbal consent to proceed has been obtained within the past 12 months. The visit was conducted pursuant to the emergency declaration under the [facility], [address] [place] [address] authority and the [employer] Act. Patient identification was verified, and a caregiver was present when appropriate. The patient was located in a state where the provider was credentialed to provide care.     Erna Doyle NP

## 2021-04-12 ENCOUNTER — OFFICE VISIT (OUTPATIENT)
Dept: ORTHOPEDIC SURGERY | Age: 36
End: 2021-04-12
Payer: COMMERCIAL

## 2021-04-12 VITALS
HEIGHT: 63 IN | HEART RATE: 73 BPM | OXYGEN SATURATION: 100 % | RESPIRATION RATE: 16 BRPM | BODY MASS INDEX: 32.25 KG/M2 | WEIGHT: 182 LBS

## 2021-04-12 DIAGNOSIS — M25.511 ACUTE PAIN OF RIGHT SHOULDER: ICD-10-CM

## 2021-04-12 DIAGNOSIS — M65.4 DE QUERVAIN'S TENOSYNOVITIS, RIGHT: Primary | ICD-10-CM

## 2021-04-12 PROCEDURE — 99203 OFFICE O/P NEW LOW 30 MIN: CPT | Performed by: ORTHOPAEDIC SURGERY

## 2021-04-12 PROCEDURE — 20550 NJX 1 TENDON SHEATH/LIGAMENT: CPT | Performed by: ORTHOPAEDIC SURGERY

## 2021-04-12 NOTE — PROGRESS NOTES
Benigno Perkins is a 39 y.o. female right handed unspecified employment. Worker's Compensation and legal considerations: none filed. Vitals:    04/12/21 1040   Pulse: 73   Resp: 16   SpO2: 100%   Weight: 182 lb (82.6 kg)   Height: 5' 3.39\" (1.61 m)   PainSc:   5   PainLoc: Wrist           Chief Complaint   Patient presents with    Wrist Pain     right wrist         HPI: Patient presents today with right wrist pain specifically on the thumb side of the wrist.    Date of onset: Late 2019    Injury: No    Prior Treatment:  Yes: Comment: Brace    Numbness/ Tingling: No      ROS: Review of Systems - General ROS: negative  Psychological ROS: negative  ENT ROS: negative  Allergy and Immunology ROS: negative  Hematological and Lymphatic ROS: negative  Respiratory ROS: no cough, shortness of breath, or wheezing  Cardiovascular ROS: no chest pain or dyspnea on exertion  Gastrointestinal ROS: no abdominal pain, change in bowel habits, or black or bloody stools  Musculoskeletal ROS: positive for - pain in wrist - right and swelling in wrist - right  Neurological ROS: negative  Dermatological ROS: negative    Past Medical History:   Diagnosis Date    History of pre-eclampsia     Pelvic pain        Past Surgical History:   Procedure Laterality Date    HX APPENDECTOMY  2013    HX WISDOM TEETH EXTRACTION  2004       Current Outpatient Medications   Medication Sig Dispense Refill    mv-mn/folic ac/calcium/vit K1 (WOMEN'S 50 PLUS DAILY FORMULA PO) Take  by mouth.  triamcinolone (ARISTOCORT) 0.5 % topical cream APPLY A THIN LAYER TO THE AFFECTED AREA(S) TWO TIMES A DAY 15 g 0    ketoconazole (NIZORAL) 2 % topical cream APPLY TO AFFECTED AREA(S) DAILY FOR 7 DAYS 15 g 0       Allergies   Allergen Reactions    Cephalexin Hives    Gluten Diarrhea and Itching           PE:     Physical Exam  Vitals signs and nursing note reviewed. Constitutional:       General: She is not in acute distress.      Appearance: Normal appearance. She is not ill-appearing. Neck:      Musculoskeletal: Normal range of motion and neck supple. Musculoskeletal:         General: Swelling and tenderness present. No deformity or signs of injury. Right lower leg: No edema. Left lower leg: No edema. Skin:     General: Skin is warm and dry. Capillary Refill: Capillary refill takes less than 2 seconds. Findings: No bruising or erythema. Neurological:      General: No focal deficit present. Mental Status: She is alert and oriented to person, place, and time. Psychiatric:         Mood and Affect: Mood normal.         Behavior: Behavior normal.            Wrist:    Tenderness L R Test L R   1st Ext Comp - + Finkelstein's - +   Snuff Box - - Lees - -   2nd Ext Comp - - S-L Shear - -   S-L Joint - - L-T Shear - -   L-T Joint - - DRUJ Sup - -   6th Ext Comp - - DRUJ Pro - -   Ulnar Snuff - - DRUJ Grind - -   Fovea - - TFCC - -   STT Joint - - Mid-Carp Inst - -   FCR - - P-T Grind - -   Intersection - - ECU Sublux. - -      Dorsal Ganglion: -   Volar Ganglion: -      ROM: Full        Imaging:     None indicated        ICD-10-CM ICD-9-CM    1. De Quervain's tenosynovitis, right  M65.4 727.04 AMB SUPPLY ORDER      triamcinolone acetonide (KENALOG) 10 mg/mL injection 5 mg      INJECT TENDON SHEATH/LIGAMENT   2. Acute pain of right shoulder  M25.511 719.41 REFERRAL TO ORTHOPEDIC SURGERY         Plan:     Right first dorsal compartment injection and right DQ brace    Follow-up and Dispositions    · Return in about 6 weeks (around 5/24/2021) for Reevaluation and exercises.           Plan was reviewed with patient, who verbalized agreement and understanding of the plan    2042 [facility] NOTE        Chart reviewed for the following:   IJeet, DO, have reviewed the History, Physical and updated the Allergic reactions for [facility]     TIME OUT performed immediately prior to start of procedure:   Jeet CORCORAN DO, have performed the following reviews on Paul Orr prior to the start of the procedure:            * Patient was identified by name and date of birth   * Agreement on procedure being performed was verified  * Risks and Benefits explained to the patient  * Procedure site verified and marked as necessary  * Patient was positioned for comfort  * Consent was signed and verified     Time: 11:05 AM      Date of procedure: 4/12/2021    Procedure performed by: Keyanna Brunson DO    Provider assisted by: Deondre Rojas LPN    Patient assisted by: self    How tolerated by patient: tolerated the procedure well with no complications    Post Procedural Pain Scale: 0 - No Hurt    Comments: none    Procedure:  After consent was obtained, using sterile technique the right wrist was prepped. Local anesthetic used: 1% lidocaine. Kenalog 5 mg and was then injected and the needle withdrawn. The procedure was well tolerated. The patient is asked to continue to rest the area for a few more days before resuming regular activities. It may be more painful for the first 1-2 days. Watch for fever, or increased swelling or persistent pain in the joint. Call or return to clinic prn if such symptoms occur or there is failure to improve as anticipated. Dustin Colvin Jr

## 2021-04-23 ENCOUNTER — OFFICE VISIT (OUTPATIENT)
Dept: FAMILY MEDICINE CLINIC | Age: 36
End: 2021-04-23
Payer: COMMERCIAL

## 2021-04-23 VITALS
HEIGHT: 63 IN | WEIGHT: 183 LBS | TEMPERATURE: 97.5 F | RESPIRATION RATE: 20 BRPM | SYSTOLIC BLOOD PRESSURE: 123 MMHG | DIASTOLIC BLOOD PRESSURE: 80 MMHG | HEART RATE: 74 BPM | BODY MASS INDEX: 32.43 KG/M2 | OXYGEN SATURATION: 98 %

## 2021-04-23 DIAGNOSIS — Z00.00 WELL WOMAN EXAM WITHOUT GYNECOLOGICAL EXAM: Primary | ICD-10-CM

## 2021-04-23 DIAGNOSIS — Z13.89 SCREENING FOR CARDIOVASCULAR, RESPIRATORY, AND GENITOURINARY DISEASES: ICD-10-CM

## 2021-04-23 DIAGNOSIS — R06.83 SNORING: ICD-10-CM

## 2021-04-23 DIAGNOSIS — Z01.82 ENCOUNTER FOR ALLERGY TESTING: ICD-10-CM

## 2021-04-23 DIAGNOSIS — R21 RASH OF HANDS: ICD-10-CM

## 2021-04-23 DIAGNOSIS — Z13.83 SCREENING FOR CARDIOVASCULAR, RESPIRATORY, AND GENITOURINARY DISEASES: ICD-10-CM

## 2021-04-23 DIAGNOSIS — Z13.6 SCREENING FOR CARDIOVASCULAR, RESPIRATORY, AND GENITOURINARY DISEASES: ICD-10-CM

## 2021-04-23 PROCEDURE — 99395 PREV VISIT EST AGE 18-39: CPT | Performed by: NURSE PRACTITIONER

## 2021-04-23 RX ORDER — CLOBETASOL PROPIONATE 0.5 MG/G
CREAM TOPICAL 2 TIMES DAILY
Qty: 30 G | Refills: 0 | Status: SHIPPED | OUTPATIENT
Start: 2021-04-23 | End: 2021-05-07

## 2021-04-23 NOTE — PROGRESS NOTES
Subjective:   39 y.o. female for Well Woman Check. Her gyne and breast care is done elsewhere by her Ob-Gyne physician. There is no problem list on file for this patient. There are no active problems to display for this patient. Current Outpatient Medications   Medication Sig Dispense Refill    triamcinolone (ARISTOCORT) 0.5 % topical cream APPLY A THIN LAYER TO THE AFFECTED AREA(S) TWO TIMES A DAY 15 g 0    ketoconazole (NIZORAL) 2 % topical cream APPLY TO AFFECTED AREA(S) DAILY FOR 7 DAYS 15 g 0    mv-mn/folic ac/calcium/vit K1 (WOMEN'S 50 PLUS DAILY FORMULA PO) Take  by mouth. Allergies   Allergen Reactions    Cephalexin Hives    Gluten Diarrhea and Itching     Past Medical History:   Diagnosis Date    History of pre-eclampsia     Pelvic pain      Past Surgical History:   Procedure Laterality Date    HX APPENDECTOMY  2013    HX WISDOM TEETH EXTRACTION  2004     Family History   Problem Relation Age of Onset    No Known Problems Mother     No Known Problems Father     Hypertension Sister     Anxiety Sister     OCD Sister     No Known Problems Brother      Social History     Tobacco Use    Smoking status: Never Smoker    Smokeless tobacco: Never Used   Substance Use Topics    Alcohol use: Not Currently      ROS: Feeling generally well. No TIA's or unusual headaches, no dysphagia. No prolonged cough. No dyspnea or chest pain on exertion. No abdominal pain, change in bowel habits, black or bloody stools. No urinary tract symptoms. No new or unusual musculoskeletal symptoms. Specific concerns today: patient states she would like to have labs drawn for thyroid. Also states she has a recurrent rash to right hand. Comments rash has been gradually spreading. Requesting a referral to dermatology. Objective: The patient appears well, alert, oriented x 3, in no distress.   Visit Vitals  /80 (BP 1 Location: Left arm, BP Patient Position: Sitting, BP Cuff Size: Adult) Pulse 74   Temp 97.5 °F (36.4 °C) (Temporal)   Resp 20   Ht 5' 3.39\" (1.61 m)   Wt 183 lb (83 kg)   LMP 04/23/2021   SpO2 98%   BMI 32.02 kg/m²     ENT normal.  Neck supple. No adenopathy or thyromegaly. WESLEY. Lungs are clear, good air entry, no wheezes, rhonchi or rales. S1 and S2 normal, no murmurs, regular rate and rhythm. Abdomen soft without tenderness, guarding, mass or organomegaly. Extremities show no edema, normal peripheral pulses. Neurological is normal, no focal findings. Breast and Pelvic exams are deferred. Skin: dry skin present to bilateral palms    Assessment/Plan:   Well Woman      ICD-10-CM ICD-9-CM    1. Well woman exam without gynecological exam  Z00.00 V70.0    2. Screening for cardiovascular, respiratory, and genitourinary diseases  Z13.6 V81.2 HEPATITIS C AB    Z13.89 V81.6 TSH 3RD GENERATION    Z13.83 V81.4 LIPID PANEL      METABOLIC PANEL, COMPREHENSIVE      HEMOGLOBIN A1C WITH EAG      T4, FREE   3. Rash of hands  R21 782.1 REFERRAL TO DERMATOLOGY   4. Snoring  R06.83 786.09 SLEEP MEDICINE REFERRAL   5. Encounter for allergy testing  Z01.82 V72.7 REFERRAL TO ENT-OTOLARYNGOLOGY     Orders Placed This Encounter    HEPATITIS C AB    TSH 3RD GENERATION    LIPID PANEL    METABOLIC PANEL, COMPREHENSIVE    HEMOGLOBIN A1C WITH EAG    T4, FREE    SLEEP MEDICINE REFERRAL    REFERRAL TO ENT-OTOLARYNGOLOGY    REFERRAL TO DERMATOLOGY    clobetasoL (TEMOVATE) 0.05 % topical cream     I have discussed the diagnosis with the patient and the intended plan as seen in the above orders. The patient has received an after-visit summary and questions were answered concerning future plans. I have discussed medication side effects and warnings with the patient as well. Patient agreeable with above plan and verbalizes understanding.

## 2021-04-23 NOTE — PATIENT INSTRUCTIONS
Well Visit, Ages 25 to 48: Care Instructions Overview Well visits can help you stay healthy. Your doctor has checked your overall health and may have suggested ways to take good care of yourself. Your doctor also may have recommended tests. At home, you can help prevent illness with healthy eating, regular exercise, and other steps. Follow-up care is a key part of your treatment and safety. Be sure to make and go to all appointments, and call your doctor if you are having problems. It's also a good idea to know your test results and keep a list of the medicines you take. How can you care for yourself at home? · Get screening tests that you and your doctor decide on. Screening helps find diseases before any symptoms appear. · Eat healthy foods. Choose fruits, vegetables, whole grains, protein, and low-fat dairy foods. Limit fat, especially saturated fat. Reduce salt in your diet. · Limit alcohol. If you are a man, have no more than 2 drinks a day or 14 drinks a week. If you are a woman, have no more than 1 drink a day or 7 drinks a week. · Get at least 30 minutes of physical activity on most days of the week. Walking is a good choice. You also may want to do other activities, such as running, swimming, cycling, or playing tennis or team sports. Discuss any changes in your exercise program with your doctor. · Reach and stay at a healthy weight. This will lower your risk for many problems, such as obesity, diabetes, heart disease, and high blood pressure. · Do not smoke or allow others to smoke around you. If you need help quitting, talk to your doctor about stop-smoking programs and medicines. These can increase your chances of quitting for good. · Care for your mental health. It is easy to get weighed down by worry and stress. Learn strategies to manage stress, like deep breathing and mindfulness, and stay connected with your family and community.  If you find you often feel sad or hopeless, talk with your doctor. Treatment can help. · Talk to your doctor about whether you have any risk factors for sexually transmitted infections (STIs). You can help prevent STIs if you wait to have sex with a new partner (or partners) until you've each been tested for STIs. It also helps if you use condoms (male or female condoms) and if you limit your sex partners to one person who only has sex with you. Vaccines are available for some STIs, such as HPV. · Use birth control if it's important to you to prevent pregnancy. Talk with your doctor about the choices available and what might be best for you. · If you think you may have a problem with alcohol or drug use, talk to your doctor. This includes prescription medicines (such as amphetamines and opioids) and illegal drugs (such as cocaine and methamphetamine). Your doctor can help you figure out what type of treatment is best for you. · Protect your skin from too much sun. When you're outdoors from 10 a.m. to 4 p.m., stay in the shade or cover up with clothing and a hat with a wide brim. Wear sunglasses that block UV rays. Even when it's cloudy, put broad-spectrum sunscreen (SPF 30 or higher) on any exposed skin. · See a dentist one or two times a year for checkups and to have your teeth cleaned. · Wear a seat belt in the car. When should you call for help? Watch closely for changes in your health, and be sure to contact your doctor if you have any problems or symptoms that concern you. Where can you learn more? Go to http://www.PPTV.com/ Enter P072 in the search box to learn more about \"Well Visit, Ages 25 to 48: Care Instructions. \" Current as of: May 27, 2020               Content Version: 12.8 © 1271-8643 [employer]. Care instructions adapted under license by [employer] (which disclaims liability or warranty for this information).  If you have questions about a medical condition or this instruction, always ask your healthcare professional. [place] [address] any warranty or liability for your use of this information.

## 2021-05-04 ENCOUNTER — TELEPHONE (OUTPATIENT)
Dept: FAMILY MEDICINE CLINIC | Age: 36
End: 2021-05-04

## 2021-05-04 ENCOUNTER — APPOINTMENT (OUTPATIENT)
Dept: FAMILY MEDICINE CLINIC | Age: 36
End: 2021-05-04

## 2021-05-04 ENCOUNTER — HOSPITAL ENCOUNTER (OUTPATIENT)
Dept: LAB | Age: 36
Discharge: HOME | End: 2021-05-04
Payer: COMMERCIAL

## 2021-05-04 DIAGNOSIS — Z13.83 SCREENING FOR CARDIOVASCULAR, RESPIRATORY, AND GENITOURINARY DISEASES: ICD-10-CM

## 2021-05-04 DIAGNOSIS — Z13.6 SCREENING FOR CARDIOVASCULAR, RESPIRATORY, AND GENITOURINARY DISEASES: ICD-10-CM

## 2021-05-04 DIAGNOSIS — Z13.89 SCREENING FOR CARDIOVASCULAR, RESPIRATORY, AND GENITOURINARY DISEASES: ICD-10-CM

## 2021-05-04 LAB
ALBUMIN SERPL-MCNC: 4 G/DL (ref 3.4–5)
ALBUMIN/GLOB SERPL: 1.2 {RATIO} (ref 0.8–1.7)
ALP SERPL-CCNC: 60 U/L (ref 45–117)
ALT SERPL-CCNC: 14 U/L (ref 13–56)
ANION GAP SERPL CALC-SCNC: 6 MMOL/L (ref 3–18)
AST SERPL-CCNC: 11 U/L (ref 10–38)
BILIRUB SERPL-MCNC: 0.4 MG/DL (ref 0.2–1)
BUN SERPL-MCNC: 11 MG/DL (ref 7–18)
BUN/CREAT SERPL: 15 (ref 12–20)
CALCIUM SERPL-MCNC: 9.1 MG/DL (ref 8.5–10.1)
CHLORIDE SERPL-SCNC: 106 MMOL/L (ref 100–111)
CHOLEST SERPL-MCNC: 185 MG/DL
CO2 SERPL-SCNC: 27 MMOL/L (ref 21–32)
CREAT SERPL-MCNC: 0.71 MG/DL (ref 0.6–1.3)
EST. AVERAGE GLUCOSE BLD GHB EST-MCNC: 100 MG/DL
GLOBULIN SER CALC-MCNC: 3.4 G/DL (ref 2–4)
GLUCOSE SERPL-MCNC: 88 MG/DL (ref 74–99)
HBA1C MFR BLD: 5.1 % (ref 4.2–5.6)
HDLC SERPL-MCNC: 42 MG/DL (ref 40–60)
HDLC SERPL: 4.4 {RATIO} (ref 0–5)
LDLC SERPL CALC-MCNC: 126 MG/DL (ref 0–100)
LIPID PROFILE,FLP: ABNORMAL
POTASSIUM SERPL-SCNC: 4.1 MMOL/L (ref 3.5–5.5)
PROT SERPL-MCNC: 7.4 G/DL (ref 6.4–8.2)
SODIUM SERPL-SCNC: 139 MMOL/L (ref 136–145)
T4 FREE SERPL-MCNC: 1 NG/DL (ref 0.7–1.5)
TRIGL SERPL-MCNC: 85 MG/DL (ref ?–150)
TSH SERPL DL<=0.05 MIU/L-ACNC: 0.54 UIU/ML (ref 0.36–3.74)
VLDLC SERPL CALC-MCNC: 17 MG/DL

## 2021-05-04 PROCEDURE — 83036 HEMOGLOBIN GLYCOSYLATED A1C: CPT

## 2021-05-04 PROCEDURE — 36415 COLL VENOUS BLD VENIPUNCTURE: CPT

## 2021-05-04 PROCEDURE — 84439 ASSAY OF FREE THYROXINE: CPT

## 2021-05-04 PROCEDURE — 80053 COMPREHEN METABOLIC PANEL: CPT

## 2021-05-04 PROCEDURE — 80061 LIPID PANEL: CPT

## 2021-05-04 PROCEDURE — 86803 HEPATITIS C AB TEST: CPT

## 2021-05-04 PROCEDURE — 84443 ASSAY THYROID STIM HORMONE: CPT

## 2021-05-05 LAB
HCV AB SER IA-ACNC: 0.12 INDEX
HCV AB SERPL QL IA: NEGATIVE
HCV COMMENT,HCGAC: NORMAL

## 2021-05-27 ENCOUNTER — OFFICE VISIT (OUTPATIENT)
Dept: ORTHOPEDIC SURGERY | Age: 36
End: 2021-05-27
Payer: COMMERCIAL

## 2021-05-27 VITALS — WEIGHT: 177 LBS | HEIGHT: 63 IN | BODY MASS INDEX: 31.36 KG/M2 | HEART RATE: 78 BPM | OXYGEN SATURATION: 99 %

## 2021-05-27 DIAGNOSIS — M65.4 DE QUERVAIN'S TENOSYNOVITIS, RIGHT: Primary | ICD-10-CM

## 2021-05-27 PROCEDURE — 99213 OFFICE O/P EST LOW 20 MIN: CPT | Performed by: ORTHOPAEDIC SURGERY

## 2021-05-27 NOTE — PROGRESS NOTES
Sofie Breen is a 39 y.o. female right handed unspecified employment. Worker's Compensation and legal considerations: none filed. Vitals:    05/27/21 1034   Pulse: 78   SpO2: 99%   Weight: 177 lb (80.3 kg)   Height: 5' 3.39\" (1.61 m)   PainSc:   5   PainLoc: Wrist           Chief Complaint   Patient presents with    Hand Pain     right wrist       HPI: Patient returns today for follow-up of her right de Quervain's tenosynovitis. She reports the pain is much improved if not completely resolved. Initial HPI: Patient presents today with right wrist pain specifically on the thumb side of the wrist.    Date of onset: Late 2019    Injury: No    Prior Treatment:  Yes: Comment: Right DQ injection and brace. Numbness/ Tingling: No      ROS: Review of Systems - General ROS: negative  Psychological ROS: negative  ENT ROS: negative  Allergy and Immunology ROS: negative  Hematological and Lymphatic ROS: negative  Respiratory ROS: no cough, shortness of breath, or wheezing  Cardiovascular ROS: no chest pain or dyspnea on exertion  Gastrointestinal ROS: no abdominal pain, change in bowel habits, or black or bloody stools  Musculoskeletal ROS: positive for - pain in wrist - right and swelling in wrist - right  Neurological ROS: negative  Dermatological ROS: negative    Past Medical History:   Diagnosis Date    History of pre-eclampsia     Pelvic pain        Past Surgical History:   Procedure Laterality Date    HX APPENDECTOMY  2013    HX WISDOM TEETH EXTRACTION  2004       Current Outpatient Medications   Medication Sig Dispense Refill    ketoconazole (NIZORAL) 2 % topical cream APPLY TO AFFECTED AREA(S) DAILY FOR 7 DAYS 15 g 0    mv-mn/folic ac/calcium/vit K1 (WOMEN'S 50 PLUS DAILY FORMULA PO) Take  by mouth. Allergies   Allergen Reactions    Cephalexin Hives    Gluten Diarrhea and Itching           PE:     Physical Exam  Vitals and nursing note reviewed.    Constitutional:       General: She is not in acute distress. Appearance: Normal appearance. She is not ill-appearing. Musculoskeletal:         General: No swelling, tenderness, deformity or signs of injury. Cervical back: Normal range of motion and neck supple. Right lower leg: No edema. Left lower leg: No edema. Skin:     General: Skin is warm and dry. Capillary Refill: Capillary refill takes less than 2 seconds. Findings: No bruising or erythema. Neurological:      General: No focal deficit present. Mental Status: She is alert and oriented to person, place, and time. Psychiatric:         Mood and Affect: Mood normal.         Behavior: Behavior normal.            Wrist:    Tenderness L R Test L R   1st Ext Comp - - Finkelstein's - -   Snuff Box - - Lees - -   2nd Ext Comp - - S-L Shear - -   S-L Joint - - L-T Shear - -   L-T Joint - - DRUJ Sup - -   6th Ext Comp - - DRUJ Pro - -   Ulnar Snuff - - DRUJ Grind - -   Fovea - - TFCC - -   STT Joint - - Mid-Carp Inst - -   FCR - - P-T Grind - -   Intersection - - ECU Sublux. - -      Dorsal Ganglion: -   Volar Ganglion: -      ROM: Full        Imaging:     None indicated        ICD-10-CM ICD-9-CM    1. De Quervain's tenosynovitis, right  M65.4 727.04    RESOLVED      Plan:     Discussed range of motion exercises to rehab her first dorsal compartment. Follow-up and Dispositions    · Return if symptoms worsen or fail to improve.           Plan was reviewed with patient, who verbalized agreement and understanding of the plan

## 2021-05-28 ENCOUNTER — OFFICE VISIT (OUTPATIENT)
Dept: ORTHOPEDIC SURGERY | Age: 36
End: 2021-05-28
Payer: COMMERCIAL

## 2021-05-28 VITALS
WEIGHT: 177 LBS | OXYGEN SATURATION: 99 % | HEART RATE: 72 BPM | RESPIRATION RATE: 15 BRPM | BODY MASS INDEX: 31.36 KG/M2 | HEIGHT: 63 IN

## 2021-05-28 DIAGNOSIS — M75.21 BICEPS TENDINITIS OF RIGHT SHOULDER: Primary | ICD-10-CM

## 2021-05-28 PROCEDURE — 99213 OFFICE O/P EST LOW 20 MIN: CPT | Performed by: ORTHOPAEDIC SURGERY

## 2021-05-28 NOTE — PROGRESS NOTES
Patient: Sari Harley                MRN: [id number]       SSN: [id number]  YOB: 1985        AGE: 39 y.o. SEX: female  Body mass index is 30.97 kg/m². PCP: Kenia Chaparro NP  05/28/21    CHIEF COMPLAINT: Right shoulder pain x 2 years    HPI: Sari Harley is a 39 y.o. female patient who is being seen today for right shoulder pain. She is right-handed. She rates the pain as 2 out of 10. She says the pain is been present off and on for approximately 2 years. The pain is worse when she tries to throw a ball or  her kids. She has not had any physical therapy or injections. She has not had any advanced imaging. Past Medical History:   Diagnosis Date    History of pre-eclampsia     Pelvic pain        Family History   Problem Relation Age of Onset    No Known Problems Mother     No Known Problems Father     Hypertension Sister     Anxiety Sister     OCD Sister     No Known Problems Brother        Current Outpatient Medications   Medication Sig Dispense Refill    ketoconazole (NIZORAL) 2 % topical cream APPLY TO AFFECTED AREA(S) DAILY FOR 7 DAYS 15 g 0    mv-mn/folic ac/calcium/vit K1 (WOMEN'S 50 PLUS DAILY FORMULA PO) Take  by mouth.          Allergies   Allergen Reactions    Cephalexin Hives    Gluten Diarrhea and Itching       Past Surgical History:   Procedure Laterality Date    HX APPENDECTOMY  2013    HX WISDOM TEETH EXTRACTION  2004       Social History     Socioeconomic History    Marital status:      Spouse name: Not on file    Number of children: Not on file    Years of education: Not on file    Highest education level: Not on file   Occupational History    Not on file   Tobacco Use    Smoking status: Never Smoker    Smokeless tobacco: Never Used   Substance and Sexual Activity    Alcohol use: Not Currently    Drug use: Never    Sexual activity: Yes     Partners: Male     Birth control/protection: Condom   Other Topics Concern    Not on file   Social History Narrative    Not on file     Social Determinants of Health     Financial Resource Strain:     Difficulty of Paying Living Expenses:    Food Insecurity:     Worried About Running Out of Food in the Last Year:     [address] in the Last Year:    Transportation Needs:     Lack of Transportation (Medical):  Lack of Transportation (Non-Medical):    Physical Activity:     Days of Exercise per Week:     Minutes of Exercise per Session:    Stress:     Feeling of Stress :    Social Connections:     Frequency of Communication with Friends and Family:     Frequency of Social Gatherings with Friends and Family:     Attends Holiness Services:     Active Member of Clubs or Organizations:     Attends Club or Organization Meetings:     Marital Status:    Intimate Partner Violence:     Fear of Current or Ex-Partner:     Emotionally Abused:     Physically Abused:     Sexually Abused:        REVIEW OF SYSTEMS:      CON: negative for recent weight loss/gain, fever, or chills  EYE: negative for double or blurry vision  ENT: negative for hoarseness  RS:   negative for cough, URI, SOB  CV:  negative for chest pain, palpitations  GI:    negative for blood in stool, nausea/vomiting  :  negative for blood in urine  MS: As per HPI  Other systems reviewed and noted below. PHYSICAL EXAMINATION:  Visit Vitals  Pulse 72   Resp 15   Ht 5' 3.39\" (1.61 m)   Wt 177 lb (80.3 kg)   SpO2 99%   BMI 30.97 kg/m²     Body mass index is 30.97 kg/m². GENERAL: Alert and oriented x3, in no acute distress, well-developed, well-nourished. HEENT: Normocephalic, atraumatic. RESP: Non labored breathing with equal chest rise on inspiration. CV: Well perfused extremities. No cyanosis or clubbing noted. ABDOMEN: Soft, non-tender, non-distended.    Shoulder Examination     R   L  ROM   FF  Full   Full  ER  Full   Full   IR  Full   Full  Rotator Cuff Pain   Supra  +   -   Infra  -   -   Subscap -   -  Crepitus  -   -  Effusion  -   -  Warmth  -   -   Erythema  -   -  Instability  -   -  AC Joint TTP  -   -  Clavicle   Deformity -   -   TTP  -   -  Proximal Humerus   Deformity -   -   TTP  -   -  Deltoid Strength 5   5  Biceps Strength 5   5  Biceps Deformity -   -  Biceps Groove Pain +   -  Impingement Sign -   -       IMAGING:  No imaging today    ASSESSMENT & PLAN  Diagnosis: Right shoulder biceps tendinitis    Shanelle has symptomatic right shoulder biceps tendinitis. I recommended physical therapy for this. I will see her back in 6 to 8 weeks.       Electronically signed by: Dariel Lowry MD

## 2021-06-02 ENCOUNTER — HOSPITAL ENCOUNTER (OUTPATIENT)
Dept: LAB | Age: 36
Discharge: HOME | End: 2021-06-02
Payer: COMMERCIAL

## 2021-06-02 LAB
IRON SATN MFR SERPL: 28 % (ref 20–50)
IRON SERPL-MCNC: 102 UG/DL (ref 50–175)
TIBC SERPL-MCNC: 363 UG/DL (ref 250–450)

## 2021-06-02 PROCEDURE — 36415 COLL VENOUS BLD VENIPUNCTURE: CPT

## 2021-06-02 PROCEDURE — 83540 ASSAY OF IRON: CPT

## 2021-09-10 ENCOUNTER — TELEPHONE (OUTPATIENT)
Dept: FAMILY MEDICINE CLINIC | Age: 36
End: 2021-09-10

## 2021-10-08 ENCOUNTER — OFFICE VISIT (OUTPATIENT)
Dept: FAMILY MEDICINE CLINIC | Age: 36
End: 2021-10-08
Payer: COMMERCIAL

## 2021-10-08 VITALS
RESPIRATION RATE: 20 BRPM | HEIGHT: 63 IN | DIASTOLIC BLOOD PRESSURE: 80 MMHG | OXYGEN SATURATION: 98 % | BODY MASS INDEX: 29.95 KG/M2 | HEART RATE: 78 BPM | WEIGHT: 169 LBS | TEMPERATURE: 97.1 F | SYSTOLIC BLOOD PRESSURE: 124 MMHG

## 2021-10-08 DIAGNOSIS — R00.2 HEART PALPITATIONS: Primary | ICD-10-CM

## 2021-10-08 PROCEDURE — 99214 OFFICE O/P EST MOD 30 MIN: CPT | Performed by: NURSE PRACTITIONER

## 2021-10-08 RX ORDER — ATENOLOL 25 MG/1
25 TABLET ORAL
Qty: 30 TABLET | Refills: 1 | Status: SHIPPED | OUTPATIENT
Start: 2021-10-08

## 2021-10-08 RX ORDER — CLOBETASOL PROPIONATE 0.5 MG/G
CREAM TOPICAL
COMMUNITY

## 2021-10-08 NOTE — PROGRESS NOTES
Chris Correa is a 39 y.o. female who was seen in clinic today (10/8/2021) for Palpitations (states she not experiencing any at this time. Heart palpitations are intermittent)    Assessment & Plan:   Diagnoses and all orders for this visit:    1. Heart palpitations  -     REFERRAL TO CARDIOLOGY    Other orders  -     atenoloL (TENORMIN) 25 mg tablet; Take 1 Tablet by mouth nightly. I have discussed the diagnosis with the patient and the intended plan as seen in the above orders. The patient has received an after-visit summary and questions were answered concerning future plans. I have discussed medication side effects and warnings with the patient as well. Patient agreeable with above plan and verbalizes understanding. Follow-up and Dispositions    · Return in about 2 weeks (around 10/22/2021) for palpitations, telemedicine MyChart. Subjective:   Patient states she has been experiencing heart palpitations for the last 6 months. States symptoms have worsened over the last month. Patient was informed she has narcolepsy. She also suffers sleep paralysis. Comments she will try to wake up and will feel her heart being fast while she is trying to wake up. Patient states her sister also has trouble with racing heart/heart palpitations. Patient states at times her chest feels tight or her heart will be begin racing, beating fast.  States at times she will feel her heart pounding. Further reports at times she will not be doing anything and will feel the symptoms.     Lab Results   Component Value Date/Time    Sodium 139 05/04/2021 11:13 AM    Potassium 4.1 05/04/2021 11:13 AM    Chloride 106 05/04/2021 11:13 AM    CO2 27 05/04/2021 11:13 AM    Anion gap 6 05/04/2021 11:13 AM    Glucose 88 05/04/2021 11:13 AM    BUN 11 05/04/2021 11:13 AM    Creatinine 0.71 05/04/2021 11:13 AM    BUN/Creatinine ratio 15 05/04/2021 11:13 AM    GFR est AA >60 05/04/2021 11:13 AM    GFR est non-AA >60 05/04/2021 11:13 AM Calcium 9.1 05/04/2021 11:13 AM    Bilirubin, total 0.4 05/04/2021 11:13 AM    Alk. phosphatase 60 05/04/2021 11:13 AM    Protein, total 7.4 05/04/2021 11:13 AM    Albumin 4.0 05/04/2021 11:13 AM    Globulin 3.4 05/04/2021 11:13 AM    A-G Ratio 1.2 05/04/2021 11:13 AM    ALT (SGPT) 14 05/04/2021 11:13 AM    AST (SGOT) 11 05/04/2021 11:13 AM     Lab Results   Component Value Date/Time    Cholesterol, total 185 05/04/2021 11:13 AM    HDL Cholesterol 42 05/04/2021 11:13 AM    LDL, calculated 126 (H) 05/04/2021 11:13 AM    VLDL, calculated 17 05/04/2021 11:13 AM    Triglyceride 85 05/04/2021 11:13 AM    CHOL/HDL Ratio 4.4 05/04/2021 11:13 AM     Lab Results   Component Value Date/Time    Hemoglobin A1c 5.1 05/04/2021 11:13 AM     No results found for: VITD3, XQVID2, XQVID3, XQVID, VD3RIA, [id number]    No results found for: WBC, WBCLT, HGBPOC, HGB, HGBP, HCTPOC, HCT, PHCT, RBCH, PLT, MCV, HGBEXT, HCTEXT, PLTEXT    Wt Readings from Last 3 Encounters:   10/08/21 169 lb (76.7 kg)   05/28/21 177 lb (80.3 kg)   05/27/21 177 lb (80.3 kg)     Temp Readings from Last 3 Encounters:   04/23/21 97.5 °F (36.4 °C) (Temporal)   09/16/20 97.1 °F (36.2 °C) (Skin)     BP Readings from Last 3 Encounters:   04/23/21 123/80   09/16/20 133/77     Pulse Readings from Last 3 Encounters:   05/28/21 72   05/27/21 78   04/23/21 74       Prior to Admission medications    Medication Sig Start Date End Date Taking? Authorizing Provider   clobetasoL (TEMOVATE) 0.05 % topical cream clobetasol 0.05 % topical cream   Yes Provider, Historical   mv-mn/folic ac/calcium/vit K1 (WOMEN'S 50 PLUS DAILY FORMULA PO) Take  by mouth. Yes Provider, Historical   ketoconazole (NIZORAL) 2 % topical cream APPLY TO AFFECTED AREA(S) DAILY FOR 7 DAYS  Patient not taking: Reported on 10/8/2021 10/15/20   Eudelia Heimlich, NP         The following sections were reviewed & updated as appropriate: PMH, PSH, FH, and SH.       Review of Systems   Constitutional: Negative for activity change, appetite change, chills, fatigue and fever. Respiratory: Negative for chest tightness and shortness of breath. Cardiovascular: Positive for palpitations. Negative for chest pain. Neurological: Negative for dizziness and headaches. Objective:     Visit Vitals  Resp 20   Ht 5' 3\" (1.6 m)   Wt 169 lb (76.7 kg)   LMP 10/02/2021   SpO2 98%   BMI 29.94 kg/m²      Physical Exam  Constitutional:       General: She is not in acute distress. Appearance: She is well-developed. HENT:      Head: Normocephalic and atraumatic. Neck:      Vascular: No carotid bruit. Cardiovascular:      Rate and Rhythm: Normal rate and regular rhythm. Heart sounds: Normal heart sounds. No murmur heard. No friction rub. No gallop. Pulmonary:      Effort: Pulmonary effort is normal.      Breath sounds: Normal breath sounds. No decreased breath sounds, wheezing, rhonchi or rales. Abdominal:      General: Bowel sounds are normal.      Palpations: Abdomen is soft. Tenderness: There is no abdominal tenderness. Musculoskeletal:      Cervical back: Normal range of motion and neck supple. Lymphadenopathy:      Cervical: No cervical adenopathy. Skin:     General: Skin is warm and dry. Neurological:      Mental Status: She is alert and oriented to person, place, and time. Disclaimer: The patient understands our medical plan. Alternatives have been explained and offered. The risks, benefits and significant side effects of all medications have been reviewed. Anticipated time course and progression of condition reviewed. All questions have been addressed. She is encouraged to employ the information provided in the after visit summary, which was reviewed. Where applicable, she is instructed to call the clinic if she has not been notified either by phone or through [address] with the results of her tests or with an appointment plan for any referrals within 1 week(s).   No news is not good news; it's no news. The patient  is to call if her condition worsens or fails to improve or if significant side effects are experienced. Aspects of this note may have been generated using voice recognition software. Despite editing, there may be unrecognized errors.        Kami Bonilla, NP

## 2021-10-08 NOTE — PATIENT INSTRUCTIONS
Palpitations: Care Instructions  Your Care Instructions     Heart palpitations are the uncomfortable sensation that your heart is beating fast or irregularly. You might feel pounding or fluttering in your chest. It might feel like your heart is skipping a beat. Although palpitations may be caused by a heart problem, they also occur because of stress, fatigue, or use of alcohol, caffeine, or nicotine. Many medicines, including diet pills, antihistamines, decongestants, and some herbal products, can cause heart palpitations. Nearly everyone has palpitations from time to time. Depending on your symptoms, your doctor may need to do more tests to try to find the cause of your palpitations. Follow-up care is a key part of your treatment and safety. Be sure to make and go to all appointments, and call your doctor if you are having problems. It's also a good idea to know your test results and keep a list of the medicines you take. How can you care for yourself at home? · Avoid caffeine, nicotine, and excess alcohol. · Do not take illegal drugs, such as methamphetamines and cocaine. · Do not take weight loss or diet medicines unless you talk with your doctor first.  · Get plenty of sleep. · Do not overeat. · If you have palpitations again, take deep breaths and try to relax. This may slow a racing heart. · If you start to feel lightheaded, lie down to avoid injuries that might result if you pass out and fall down. · Keep a record of your palpitations and bring it to your next doctor's appointment. Write down:  ? The date and time. ? Your pulse. (If your heart is beating fast, it may be hard to count your pulse.)  ? What you were doing when the palpitations started. ? How long the palpitations lasted. ? Any other symptoms. · If an activity causes palpitations, slow down or stop. Talk to your doctor before you do that activity again. · Take your medicines exactly as prescribed.  Call your doctor if you think you are having a problem with your medicine. When should you call for help? Call 911 anytime you think you may need emergency care. For example, call if:    · You passed out (lost consciousness).     · You have symptoms of a heart attack. These may include:  ? Chest pain or pressure, or a strange feeling in the chest.  ? Sweating. ? Shortness of breath. ? Pain, pressure, or a strange feeling in the back, neck, jaw, or upper belly or in one or both shoulders or arms. ? Lightheadedness or sudden weakness. ? A fast or irregular heartbeat. After you call 911, the  may tell you to chew 1 adult-strength or 2 to 4 low-dose aspirin. Wait for an ambulance. Do not try to drive yourself.     · You have symptoms of a stroke. These may include:  ? Sudden numbness, tingling, weakness, or loss of movement in your face, arm, or leg, especially on only one side of your body. ? Sudden vision changes. ? Sudden trouble speaking. ? Sudden confusion or trouble understanding simple statements. ? Sudden problems with walking or balance. ? A sudden, severe headache that is different from past headaches. Call your doctor now or seek immediate medical care if:    · You have heart palpitations and:  ? Are dizzy or lightheaded, or you feel like you may faint. ? Have new or increased shortness of breath. Watch closely for changes in your health, and be sure to contact your doctor if:    · You continue to have heart palpitations. Where can you learn more? Go to http://www.gray.com/  Enter R508 in the search box to learn more about \"Palpitations: Care Instructions. \"  Current as of: April 29, 2021               Content Version: 13.0  © 4423-1372 [employer]. Care instructions adapted under license by [employer] (which disclaims liability or warranty for this information).  If you have questions about a medical condition or this instruction, always ask your healthcare professional. [address] any warranty or liability for your use of this information.

## 2022-03-15 ENCOUNTER — OFFICE VISIT (OUTPATIENT)
Dept: CARDIOLOGY CLINIC | Age: 37
End: 2022-03-15
Payer: MEDICAID

## 2022-03-15 VITALS
WEIGHT: 173 LBS | OXYGEN SATURATION: 97 % | SYSTOLIC BLOOD PRESSURE: 124 MMHG | HEART RATE: 78 BPM | HEIGHT: 63 IN | DIASTOLIC BLOOD PRESSURE: 70 MMHG | BODY MASS INDEX: 30.65 KG/M2

## 2022-03-15 DIAGNOSIS — R00.2 PALPITATIONS: Primary | ICD-10-CM

## 2022-03-15 PROCEDURE — 93000 ELECTROCARDIOGRAM COMPLETE: CPT | Performed by: INTERNAL MEDICINE

## 2022-03-15 PROCEDURE — 99204 OFFICE O/P NEW MOD 45 MIN: CPT | Performed by: INTERNAL MEDICINE

## 2022-03-15 NOTE — PROGRESS NOTES
Angel Luis Chu presents today for   Chief Complaint   Patient presents with    New Patient     referred by PCP for palps     Palpitations     racing, fluttering, pounding        Angel Luis Chu preferred language for health care discussion is english/other. Is someone accompanying this pt? no    Is the patient using any DME equipment during [address]? no    Depression Screening:  3 most recent PHQ Screens 3/15/2022   Little interest or pleasure in doing things Not at all   Feeling down, depressed, irritable, or hopeless Not at all   Total Score PHQ 2 0   Trouble falling or staying asleep, or sleeping too much -   Feeling tired or having little energy -   Poor appetite, weight loss, or overeating -   Feeling bad about yourself - or that you are a failure or have let yourself or your family down -   Trouble concentrating on things such as school, work, reading, or watching TV -   Moving or speaking so slowly that other people could have noticed; or the opposite being so fidgety that others notice -   Thoughts of being better off dead, or hurting yourself in some way -   PHQ 9 Score -   How difficult have these problems made it for you to do your work, take care of your home and get along with others -     Learning Assessment 3/15/2022   PRIMARY LEARNER Patient   PRIMARY LANGUAGE ENGLISH   LEARNER PREFERENCE PRIMARY DEMONSTRATION   ANSWERED BY Patient   RELATIONSHIP SELF       Abuse Screening:  Abuse Screening Questionnaire 9/16/2020   Do you ever feel afraid of your partner? N   Are you in a relationship with someone who physically or mentally threatens you? N   Is it safe for you to go home? Y     Pt currently taking Anticoagulant therapy? no    Coordination of Care:  1. Have you been to the ER, urgent care clinic since your last visit? Hospitalized since your last visit? no    2. Have you seen or consulted any other health care providers outside of the [address] [place] [address] since your last visit?  Include any pap smears or colon screening.  no

## 2022-03-21 PROBLEM — R00.2 PALPITATIONS: Status: ACTIVE | Noted: 2022-03-21

## 2022-03-21 NOTE — PROGRESS NOTES
Subjective:      LUIZ's is in the office today for cardiac evaluation. She is a 70-year-old woman at has been experiencing frequent palpitations. She reports that she has a long history of palpitations. She describes the palpitations as her \"hard pounding heartbeat \". They seem to occur particularly if she is sleep deprived. She can feel her heart \"squeezing \". She says at times her heart is beating so hard she \"cannot get up \". The episodes of palpitations last 15 minutes to 3 hours. She has had no mylene syncope. She has had possible occasional near syncope. At some point she was started on atenolol 25 mg which apparently did not help much. She reports she is always \"tired \". She was evaluated with a sleep study and she  reports that she was told she had narcolepsy. Patient's cardiac risk factors are none. He does have a sister that has some type of arrhythmia. Patient Active Problem List    Diagnosis Date Noted    Palpitations 03/21/2022    Preeclampsia in postpartum period 05/22/2018     Current Outpatient Medications   Medication Sig Dispense Refill    clobetasoL (TEMOVATE) 0.05 % topical cream clobetasol 0.05 % topical cream      atenoloL (TENORMIN) 25 mg tablet Take 1 Tablet by mouth nightly. 30 Tablet 1    ketoconazole (NIZORAL) 2 % topical cream APPLY TO AFFECTED AREA(S) DAILY FOR 7 DAYS (Patient not taking: Reported on 10/8/2021) 15 g 0    mv-mn/folic ac/calcium/vit K1 (WOMEN'S 50 PLUS DAILY FORMULA PO) Take  by mouth.        Allergies   Allergen Reactions    Cephalexin Hives    Gluten Diarrhea and Itching     Past Medical History:   Diagnosis Date    History of pre-eclampsia     Pelvic pain      Past Surgical History:   Procedure Laterality Date    HX APPENDECTOMY  2013    HX WISDOM TEETH EXTRACTION  2004     Family History   Problem Relation Age of Onset    No Known Problems Mother     No Known Problems Father     Hypertension Sister     Anxiety Sister    Cirilo Flores OCD Sister  No Known Problems Brother      Social History     Tobacco Use   Smoking Status Never Smoker   Smokeless Tobacco Never Used          Review of Systems, additional:  Constitutional: negative  Eyes: negative  Respiratory: negative  Cardiovascular: positive for palpitations, near-syncope, fatigue  Gastrointestinal: negative  Musculoskeletal:negative  Neurological: negative  Behvioral/Psych: negative  Endocrine: negative  ENT: negative    Objective:     Visit Vitals  /70 (BP 1 Location: Left upper arm, BP Patient Position: Sitting, BP Cuff Size: Adult)   Pulse 78   Ht 5' 3\" (1.6 m)   Wt 78.5 kg (173 lb)   SpO2 97%   BMI 30.65 kg/m²     General:  alert, cooperative, no distress   Chest Wall: inspection normal - no chest wall deformities or tenderness, respiratory effort normal   Lung: clear to auscultation bilaterally   Heart:  normal rate and regular rhythm, S1 and S2 normal, no murmurs noted, no gallops noted, no JVD   Abdomen: soft, non-tender. Bowel sounds normal. No masses,  no organomegaly   Extremities: extremities normal, atraumatic, no cyanosis or edema Skin: no rashes   Neuro: alert, oriented, normal speech, no focal findings or movement disorder noted     EKG: 3/15/2022. Normal sinus rhythm. Normal.    Assessment/Plan:       ICD-10-CM ICD-9-CM    1. Palpitations , will order a 48-hour Holter monitor and echocardiogram.  She will return in 4 to 6 weeks. R00.2 785.1 AMB POC EKG ROUTINE W/ 12 LEADS, INTER & REP      ECHO ADULT COMPLETE      CARDIAC HOLTER MONITOR   2.  Preeclampsia in postpartum period  O14.95 642.44

## 2022-04-22 ENCOUNTER — TELEPHONE (OUTPATIENT)
Dept: CARDIOLOGY CLINIC | Age: 37
End: 2022-04-22

## 2022-04-26 ENCOUNTER — OFFICE VISIT (OUTPATIENT)
Dept: CARDIOLOGY CLINIC | Age: 37
End: 2022-04-26
Payer: MEDICAID

## 2022-04-26 VITALS — BODY MASS INDEX: 31.18 KG/M2 | HEIGHT: 63 IN | HEART RATE: 88 BPM | OXYGEN SATURATION: 97 % | WEIGHT: 176 LBS

## 2022-04-26 DIAGNOSIS — R00.2 PALPITATIONS: Primary | ICD-10-CM

## 2022-04-26 PROCEDURE — 99214 OFFICE O/P EST MOD 30 MIN: CPT | Performed by: INTERNAL MEDICINE

## 2022-04-26 NOTE — PROGRESS NOTES
Rukhsana Rosas presents today for   Chief Complaint   Patient presents with    Follow-up     4-6 weeks        Rukhsana Rosas preferred language for health care discussion is english/other. Is someone accompanying this pt? no    Is the patient using any DME equipment during [address]? no    Depression Screening:  3 most recent PHQ Screens 4/26/2022   Little interest or pleasure in doing things Not at all   Feeling down, depressed, irritable, or hopeless Not at all   Total Score PHQ 2 0   Trouble falling or staying asleep, or sleeping too much -   Feeling tired or having little energy -   Poor appetite, weight loss, or overeating -   Feeling bad about yourself - or that you are a failure or have let yourself or your family down -   Trouble concentrating on things such as school, work, reading, or watching TV -   Moving or speaking so slowly that other people could have noticed; or the opposite being so fidgety that others notice -   Thoughts of being better off dead, or hurting yourself in some way -   PHQ 9 Score -   How difficult have these problems made it for you to do your work, take care of your home and get along with others -       Learning Assessment:  Learning Assessment 3/15/2022   PRIMARY LEARNER Patient   PRIMARY LANGUAGE ENGLISH   LEARNER PREFERENCE PRIMARY DEMONSTRATION   ANSWERED BY Patient   RELATIONSHIP SELF       Abuse Screening:  Abuse Screening Questionnaire 9/16/2020   Do you ever feel afraid of your partner? N   Are you in a relationship with someone who physically or mentally threatens you? N   Is it safe for you to go home? Y     Pt currently taking Anticoagulant therapy? no    Coordination of Care:  1. Have you been to the ER, urgent care clinic since your last visit? Hospitalized since your last visit? no    2. Have you seen or consulted any other health care providers outside of the [address] [place] [address] since your last visit? Include any pap smears or colon screening.  no

## 2022-04-27 DIAGNOSIS — R00.2 PALPITATIONS: ICD-10-CM

## 2022-04-28 RX ORDER — METOPROLOL SUCCINATE 25 MG/1
12.5 TABLET, EXTENDED RELEASE ORAL DAILY
Qty: 90 TABLET | Refills: 3 | Status: SHIPPED | OUTPATIENT
Start: 2022-04-28

## 2022-05-03 NOTE — PROGRESS NOTES
Subjective:      Johann Mosquera is in the office today for cardiac reevaluation. She is a 27-year-old woman at has been experiencing frequent palpitations. She reports that she has a long history of palpitations. She describes the palpitations as her \"hard pounding heartbeat \". They seem to occur particularly if she is sleep deprived. She can feel her heart \"squeezing \". She says at times her heart is beating so hard she \"cannot get up \". The episodes of palpitations last 15 minutes to 3 hours. She has had no mylene syncope. She has had possible occasional near syncope. At some point she was started on atenolol 25 mg which apparently did not help much. She was evaluated with a sleep study and she  reports that she was told she had narcolepsy. In the office today she says she is still having palpitations. She reported that just yesterday her heart was pounding. The palpitations do not occur every day. The longest episodes generally last about 10 minutes. Patient's cardiac risk factors are none. He does have a sister that has some type of arrhythmia. Patient Active Problem List    Diagnosis Date Noted    Palpitations 03/21/2022    Preeclampsia in postpartum period 05/22/2018     Current Outpatient Medications   Medication Sig Dispense Refill    clobetasoL (TEMOVATE) 0.05 % topical cream clobetasol 0.05 % topical cream      metoprolol succinate (TOPROL-XL) 25 mg XL tablet Take 0.5 Tablets by mouth daily. 90 Tablet 3    atenoloL (TENORMIN) 25 mg tablet Take 1 Tablet by mouth nightly.  (Patient not taking: Reported on 4/26/2022) 30 Tablet 1     Allergies   Allergen Reactions    Cephalexin Hives    Gluten Diarrhea and Itching     Past Medical History:   Diagnosis Date    History of pre-eclampsia     Pelvic pain      Past Surgical History:   Procedure Laterality Date    HX APPENDECTOMY  2013    HX WISDOM TEETH EXTRACTION  2004     Family History   Problem Relation Age of Onset    No Known Problems Mother     No Known Problems Father     Hypertension Sister    Nell Alex Anxiety Sister     OCD Sister     No Known Problems Brother      Social History     Tobacco Use   Smoking Status Never Smoker   Smokeless Tobacco Never Used          Review of Systems, additional:  Constitutional: negative  Eyes: negative  Respiratory: negative  Cardiovascular: positive for palpitations, near-syncope, fatigue  Gastrointestinal: negative  Musculoskeletal:negative  Neurological: negative  Behvioral/Psych: negative  Endocrine: negative  ENT: negative    Objective:     Visit Vitals  Pulse 88   Ht 5' 3\" (1.6 m)   Wt 79.8 kg (176 lb)   SpO2 97%   BMI 31.18 kg/m²     General:  alert, cooperative, no distress   Chest Wall: inspection normal - no chest wall deformities or tenderness, respiratory effort normal   Lung: clear to auscultation bilaterally   Heart:  normal rate and regular rhythm, S1 and S2 normal, no murmurs noted, no gallops noted, no JVD   Abdomen: soft, non-tender. Bowel sounds normal. No masses,  no organomegaly   Extremities: extremities normal, atraumatic, no cyanosis or edema Skin: no rashes   Neuro: alert, oriented, normal speech, no focal findings or movement disorder noted     EKG: 3/15/2022. Normal sinus rhythm. Normal.    Assessment/Plan:       ICD-10-CM ICD-9-CM    1. Palpitations ,  48-hour Holter monitor ordered and completed on 4/15/2022. Sinus rhythm. Average heart rate 70. Minimum rate 44 maximum rate 131. No AF or flutter. No pauses. Rare PACs. No events recorded. An echocardiogram was ordered and completed on 4/25/2022. Normal ventricular size and function. EF 55 to 60%. Normal diastolic function. Ascending aorta 3.8 cm. Will prescribe metoprolol 12.5 mg daily. She will return in 6 months. R00.2 785.1 AMB POC EKG ROUTINE W/ 12 LEADS, INTER & REP      ECHO ADULT COMPLETE      CARDIAC HOLTER MONITOR   2.  Preeclampsia in postpartum period  O14.95 642.44

## 2022-09-16 DIAGNOSIS — R21 RASH OF HANDS: Primary | ICD-10-CM

## 2022-09-16 NOTE — TELEPHONE ENCOUNTER
Patient states the rash on her hand is back so she is requesting a refill    Last Visit: 10/8/21 with NP [place]  Next Appointment: none  Previous Refill Encounter(s): 10/29/20 #15    Requested Prescriptions     Pending Prescriptions Disp Refills    triamcinolone (ARISTOCORT) 0.5 % topical cream 15 g 0     Sig: Apply  to affected area two (2) times a day. use thin layer         For Pharmacy Admin Tracking Only    CPA in place:   Recommendation Provided To:    Intervention Detail: New Rx: 1, reason: Patient Preference  Gap Closed?:   Intervention Accepted By:   Time Spent (min): 5

## 2022-09-22 RX ORDER — TRIAMCINOLONE ACETONIDE 5 MG/G
CREAM TOPICAL 2 TIMES DAILY
Qty: 15 G | Refills: 0 | OUTPATIENT
Start: 2022-09-22

## 2023-10-27 NOTE — PROGRESS NOTES
PF DAILY TREATMENT NOTE 3-16    Patient Name: Fabricio Melendez  Date:2020  : 1985  [x]  Patient  Verified  Payor: [address] [place] [address] Road / Plan: [address] / Product Type: Managed Care Medicaid /    In time: 12:50  Out time:1:40  Total Treatment Time (min): 50  Visit #: 2 of 4    Treatment Area: [x] Pelvic Floor     [] Other:    SUBJECTIVE  Pain Level (0-10 scale): 2/10  Any medication changes, allergies to medications, adverse drug reactions, diagnosis change, or new procedure performed?: [x] No    [] Yes (see summary sheet for update)  Subjective functional status/changes:   [] No changes reported  Pt reports having a very busy week as her mother came for visit. She didn't have to to do HEP or get TENs unit and therawand as planned.     OBJECTIVE    Modality rationale: increase muscle contraction/control to improve the patients ability to improve ease with ADLs and sexual relations quality    Min Type Additional Details    [] Estim:  []Unatt       []IFC  []Premod                        []Other:  []w/ice   []w/heat  Position:  Location:   15 [] Estim: []Att    []TENS instruct  []NMES                    [x]Other: Biofeedback with internal sesnsor []w/US   []w/ice   []w/heat  Position:  Location:    []  Ultrasound: []Continuous   [] Pulsed                           []1MHz   []3MHz Position:  Location:    []  Ice     []  heat  []  Ice massage  []  Laser   []  Anodyne Position:  Location:   [] Skin assessment post-treatment:  []intact []redness- no adverse reaction    []redness  adverse reaction:         35 min Therapeutic Activity:  []  See flow sheet :    []  Increase Tissue extensibility        []  Assess fiber intake    [x]  Assess voiding habits  [x]  Assess bowel habits  [x]  Other: finding balance, reviewed HEP   Rationale: increase ROM, increase strength, improve coordination and increase proprioception  to improve the patients ability to improve sexual relations Pt. Notified via portal message.    quality    With   [] TE   [] TA   [] neuro  [] manual   [] other: Patient Education: [x] Review HEP    [] Progressed/Changed HEP based on:   [] positioning   [] body mechanics   [] transfers   [] heat/ice application    [] other:      Other Objective/Functional Measures:   [x]baseline resting tone: 4.8 µV  [x]slow twitch mms 5 sec hold, 10 sec rest, 10 rep: work: 7.5 µV, rest: 4.2 µV  [x]fast twitch mms: 4 µV    Pain Level (0-10 scale) post treatment: 1/10    ASSESSMENT/Changes in Function: pt cont to make poor progress and demonstrates fair compliance with HEP, partially due to busy personal schedule. Pt cont to improve tone with PF muscles downtraining. Will cont with manual and ESTIM next visit; progress with education on TENS unit and therawand use if pt got these on time. []  Decrease # of leaks   [] No change []  Improving [] Resolved     []  Decrease hypertonus [] No change []  Improving [] Resolved     []  Increase void interval [] No change []  Improving [] Resolved     []  Increase PF strength [] No change []  Improving [] Resolved     []  Increase PF endurance [] No change []  Improving [] Resolved     []  Increase endurance [] No change []  Improving [] Resolved     []  Decrease # of pads [] No change []  Improving [] Resolved     []  Decrease pain [] No change []  Improving [] Resolved     []  Increased coordination [] No change []  Improving [] Resolved     []  Increased Bowel Frequency [] No change []  Improving [] Resolved       Patient will continue to benefit from skilled PT services to modify and progress therapeutic interventions, address functional mobility deficits, address ROM deficits, address strength deficits, analyze and address soft tissue restrictions, analyze and cue movement patterns, analyze and modify body mechanics/ergonomics, assess and modify postural abnormalities and instruct in home and community integration to attain remaining goals.      []  See Plan of Care  [x]  See progress note/recertification  []  See Discharge Summary         Progress towards goals / Updated goals:  Updated Goals: to be achieved in 4 weeks:  Goal 1: Patient to score 10 or less with FOTO score for PFDI pain to indicate improved function.       Goal 2: Patient to report 50% or greater overall improvement with pain with ADLs, for return to PLOF.    Current: reported more good days than pain days 11-     Goal 3: Patient will be I with self management of pain with strategies learned in PT for preparation for discharge.     Current: progressing, given information for Therawand and TENs unit 11-    PLAN  [x]  Upgrade activities as tolerated     [x]  Continue plan of care  []  Update interventions per flow sheet       []  Discharge due to:_  []  Other:_      Lucia Graham, PT 11/23/2020  10:26 AM    Future Appointments   Date Time Provider Naren Khan   11/23/2020 12:45 PM Donna Thomas [id number] [facility]   11/30/2020 12:45 PM Donna Thomas [id number] [facility]   12/7/2020 12:45 PM Donna Thomas [id number] [facility]